# Patient Record
Sex: FEMALE | Race: WHITE | Employment: UNEMPLOYED | URBAN - METROPOLITAN AREA
[De-identification: names, ages, dates, MRNs, and addresses within clinical notes are randomized per-mention and may not be internally consistent; named-entity substitution may affect disease eponyms.]

---

## 2018-01-12 ENCOUNTER — HOSPITAL ENCOUNTER (EMERGENCY)
Facility: CLINIC | Age: 23
Discharge: HOME | End: 2018-01-13
Attending: EMERGENCY MEDICINE
Payer: MEDICARE

## 2018-01-12 VITALS
OXYGEN SATURATION: 98 % | BODY MASS INDEX: 21.79 KG/M2 | RESPIRATION RATE: 18 BRPM | SYSTOLIC BLOOD PRESSURE: 118 MMHG | TEMPERATURE: 98.3 F | DIASTOLIC BLOOD PRESSURE: 76 MMHG | HEIGHT: 63 IN | HEART RATE: 107 BPM | WEIGHT: 123 LBS

## 2018-01-12 DIAGNOSIS — T40.1X1D ACCIDENTAL OVERDOSE OF HEROIN, SUBSEQUENT ENCOUNTER: Primary | ICD-10-CM

## 2018-01-12 PROCEDURE — 99284 EMERGENCY DEPT VISIT MOD MDM: CPT

## 2018-01-12 ASSESSMENT — ENCOUNTER SYMPTOMS
EYE PAIN: 0
VOMITING: 0
STRIDOR: 0
COLOR CHANGE: 0
EYE DISCHARGE: 0
NAUSEA: 0
EYE REDNESS: 0
CONSTIPATION: 0
SHORTNESS OF BREATH: 0
WHEEZING: 0
SORE THROAT: 0
COUGH: 0
DIARRHEA: 0
ABDOMINAL PAIN: 0

## 2018-01-13 PROCEDURE — 6360000002 HC RX W HCPCS: Performed by: EMERGENCY MEDICINE

## 2018-01-13 RX ORDER — IBUPROFEN 600 MG/1
600 TABLET ORAL ONCE
Status: DISCONTINUED | OUTPATIENT
Start: 2018-01-13 | End: 2018-01-13 | Stop reason: HOSPADM

## 2018-01-13 RX ORDER — ONDANSETRON 4 MG/1
4 TABLET, ORALLY DISINTEGRATING ORAL ONCE
Status: COMPLETED | OUTPATIENT
Start: 2018-01-13 | End: 2018-01-13

## 2018-01-13 RX ADMIN — ONDANSETRON 4 MG: 4 TABLET, ORALLY DISINTEGRATING ORAL at 01:32

## 2018-01-13 NOTE — ED PROVIDER NOTES
problems and confusion. PAST MEDICAL HISTORY    has a past medical history of Anxiety; Depression; Endocarditis; Hepatitis C; and Overdose. SURGICAL HISTORY      has a past surgical history that includes Adenoidectomy. CURRENT MEDICATIONS       Previous Medications    ACETAMINOPHEN-CODEINE (TYLENOL/CODEINE #3) 300-30 MG PER TABLET    Take 1 tablet by mouth every 6 hours as needed for Pain    DICYCLOMINE (BENTYL) 10 MG CAPSULE    Take 1 capsule by mouth every 6 hours as needed (cramps)    ETONOGESTREL (IMPLANON SC)    Inject into the skin    IBUPROFEN (ADVIL;MOTRIN) 600 MG TABLET    Take 1 tablet by mouth every 8 hours as needed for Pain    MELATONIN 3 MG TABS TABLET    Take 3 mg by mouth daily    NAPROXEN (NAPROSYN) 500 MG TABLET    Take 500 mg by mouth 2 times daily (with meals)       ALLERGIES     has No Known Allergies. FAMILY HISTORY     has no family status information on file. family history is not on file. SOCIAL HISTORY      reports that she has been smoking. She has been smoking about 0.50 packs per day. She does not have any smokeless tobacco history on file. She reports that she uses drugs. She reports that she does not drink alcohol. PHYSICAL EXAM    (up to 7 for level 4, 8 or more for level 5)   INITIAL VITALS:  height is 5' 3\" (1.6 m) and weight is 55.8 kg (123 lb). Her temperature is 98.3 °F (36.8 °C). Her blood pressure is 118/76 and her pulse is 107. Her respiration is 18 and oxygen saturation is 98%. Physical Exam   Constitutional: She is oriented to person, place, and time. She appears well-developed and well-nourished. No distress. HENT:   Head: Normocephalic and atraumatic. Eyes: Conjunctivae and EOM are normal. Pupils are equal, round, and reactive to light. Right eye exhibits no discharge. Left eye exhibits no discharge. Neck: Normal range of motion. Neck supple. Cardiovascular: Regular rhythm and normal heart sounds. Tachycardia present.     No murmur

## 2019-06-05 ENCOUNTER — HOSPITAL ENCOUNTER (EMERGENCY)
Age: 24
Discharge: HOME | End: 2019-06-05
Attending: EMERGENCY MEDICINE
Payer: MEDICAID

## 2019-06-05 VITALS
WEIGHT: 121 LBS | SYSTOLIC BLOOD PRESSURE: 98 MMHG | BODY MASS INDEX: 21.44 KG/M2 | HEART RATE: 84 BPM | TEMPERATURE: 98.1 F | DIASTOLIC BLOOD PRESSURE: 77 MMHG | HEIGHT: 63 IN | OXYGEN SATURATION: 100 % | RESPIRATION RATE: 18 BRPM

## 2019-06-05 DIAGNOSIS — N73.9 FEMALE PELVIC INFLAMMATORY DISEASE: Primary | ICD-10-CM

## 2019-06-05 LAB
CHP ED QC CHECK: NORMAL
PREGNANCY TEST URINE, POC: NORMAL

## 2019-06-05 PROCEDURE — 81025 URINE PREGNANCY TEST: CPT

## 2019-06-05 PROCEDURE — 87591 N.GONORRHOEAE DNA AMP PROB: CPT

## 2019-06-05 PROCEDURE — 99283 EMERGENCY DEPT VISIT LOW MDM: CPT

## 2019-06-05 PROCEDURE — 87491 CHLMYD TRACH DNA AMP PROBE: CPT

## 2019-06-05 PROCEDURE — 6370000000 HC RX 637 (ALT 250 FOR IP): Performed by: EMERGENCY MEDICINE

## 2019-06-05 RX ORDER — ONDANSETRON 4 MG/1
4 TABLET, ORALLY DISINTEGRATING ORAL ONCE
Status: COMPLETED | OUTPATIENT
Start: 2019-06-05 | End: 2019-06-05

## 2019-06-05 RX ORDER — DOXYCYCLINE HYCLATE 100 MG
100 TABLET ORAL 2 TIMES DAILY
Qty: 20 TABLET | Refills: 0 | Status: SHIPPED | OUTPATIENT
Start: 2019-06-05 | End: 2019-06-15

## 2019-06-05 RX ORDER — AZITHROMYCIN 250 MG/1
2000 TABLET, FILM COATED ORAL ONCE
Status: COMPLETED | OUTPATIENT
Start: 2019-06-05 | End: 2019-06-05

## 2019-06-05 RX ADMIN — AZITHROMYCIN MONOHYDRATE 2000 MG: 250 TABLET ORAL at 09:24

## 2019-06-05 RX ADMIN — ONDANSETRON 4 MG: 4 TABLET, ORALLY DISINTEGRATING ORAL at 10:04

## 2019-06-05 ASSESSMENT — ENCOUNTER SYMPTOMS: ABDOMINAL PAIN: 1

## 2019-06-05 NOTE — ED PROVIDER NOTES
EMERGENCY DEPARTMENT ENCOUNTER    Pt Name: Taiwo Rizvi  MRN: [id number]  [place] 1995  Date of evaluation: 6/5/19  CHIEF COMPLAINT       Chief Complaint   Patient presents with    Exposure to STD    Vaginal Discharge     HISTORY OF PRESENT ILLNESS   Presents with vaginal discharge for 1.5 weeks. Partner was diagnosed and treated for urethritis. Patient states she gave it to him. Declines Rocephin. Had LLQ/pelvic pain for 3 days, but it resolved. The history is provided by the patient. Vaginal Discharge   Severity:  Moderate  Onset quality:  Unable to specify  Duration:  9 days  Timing:  Constant  Progression:  Worsening  Chronicity:  New  Relieved by:  Nothing  Worsened by:  Nothing  Ineffective treatments:  None tried  Associated symptoms: abdominal pain    Associated symptoms: no dysuria, no fever and no urinary frequency        REVIEW OF SYSTEMS     Review of Systems   Constitutional: Negative for fever. Gastrointestinal: Positive for abdominal pain. Genitourinary: Positive for vaginal discharge. Negative for dysuria. PASTMEDICAL HISTORY     Past Medical History:   Diagnosis Date    Anxiety     Depression     Endocarditis     Hepatitis C     Overdose      SURGICAL HISTORY       Past Surgical History:   Procedure Laterality Date    ADENOIDECTOMY       CURRENT MEDICATIONS       Previous Medications    ACETAMINOPHEN-CODEINE (TYLENOL/CODEINE #3) 300-30 MG PER TABLET    Take 1 tablet by mouth every 6 hours as needed for Pain    DICYCLOMINE (BENTYL) 10 MG CAPSULE    Take 1 capsule by mouth every 6 hours as needed (cramps)    ETONOGESTREL (IMPLANON SC)    Inject into the skin    IBUPROFEN (ADVIL;MOTRIN) 600 MG TABLET    Take 1 tablet by mouth every 8 hours as needed for Pain    MELATONIN 3 MG TABS TABLET    Take 3 mg by mouth daily    NAPROXEN (NAPROSYN) 500 MG TABLET    Take 500 mg by mouth 2 times daily (with meals)     ALLERGIES     has No Known Allergies.   FAMILY HISTORY     has no family status information on file. SOCIAL HISTORY       Social History     Tobacco Use    Smoking status: Current Every Day Smoker     Packs/day: 0.50   Substance Use Topics    Alcohol use: No    Drug use: Yes     Comment: hx heroin, now [place] white     PHYSICAL EXAM     INITIAL VITALS: BP 98/77   Pulse 84   Temp 98.1 °F (36.7 °C) (Oral)   Resp 18   Ht 5' 3\" (1.6 m)   Wt 121 lb (54.9 kg)   LMP 05/15/2019   SpO2 100%   BMI 21.43 kg/m²    Physical Exam   Constitutional: She appears well-developed and well-nourished. No distress. HENT:   Mouth/Throat: Oropharynx is clear and moist. No oropharyngeal exudate. Eyes: Conjunctivae are normal. Right eye exhibits no discharge. Left eye exhibits no discharge. Cardiovascular: Normal rate, regular rhythm and intact distal pulses. Abdominal: Soft. There is no tenderness. There is no guarding. Neurological: She is alert. No sensory deficit. She exhibits normal muscle tone. Coordination normal.   Skin: Capillary refill takes less than 2 seconds. No rash noted. Psychiatric: She has a normal mood and affect. Her behavior is normal.       MEDICAL DECISION MAKING:   Presents with symptoms of STI. Refused Rocephin, despite discussion regarding resistance to oral medications. Especially concerning as plan to treat for PID. Still declines. Suprax is unavailable. Treated with Azithromycin 2 grams PO. Urine pregnancy test is negative. Rx for Doxycycline for 10 days provided. Directed for f/u. Refrain from unprotected intercourse until completion of treatment and asymptomatic. Educated on s/s TOA. Return immediately for any concerns.  Currently afebrile and non-toxic             CRITICAL CARE:       PROCEDURES:    Procedures    DIAGNOSTIC RESULTS   EKG:All EKG's are interpreted by the Emergency Department Physician who either signs or Co-signs this chart in the absence of a cardiologist.        RADIOLOGY:All plain film, CT, MRI, and formal ultrasound images (except ED bedside ultrasound) are read by the radiologist, see reports below, unless otherwisenoted in MDM or here. No orders to display     LABS: All lab results were reviewed by myself, and all abnormals are listed below. Labs Reviewed   C.TRACHOMATIS N.GONORRHOEAE DNA, URINE   POCT URINE PREGNANCY       EMERGENCY DEPARTMENTCOURSE:         Vitals:    Vitals:    06/05/19 0858   BP: 98/77   Pulse: 84   Resp: 18   Temp: 98.1 °F (36.7 °C)   TempSrc: Oral   SpO2: 100%   Weight: 121 lb (54.9 kg)   Height: 5' 3\" (1.6 m)       The patient was given the following medications while in the emergency department:  Orders Placed This Encounter   Medications    azithromycin (ZITHROMAX) tablet 2,000 mg    doxycycline hyclate (VIBRA-TABS) 100 MG tablet     Sig: Take 1 tablet by mouth 2 times daily for 10 days     Dispense:  20 tablet     Refill:  0     CONSULTS:  None    FINAL IMPRESSION      1.  Female pelvic inflammatory disease          DISPOSITION/PLAN   DISPOSITION Decision To Discharge 06/05/2019 09:22:09 AM      PATIENT REFERRED TO:  Call SAME-DAY PCP  [phone]  In 1 week      DISCHARGE MEDICATIONS:  New Prescriptions    DOXYCYCLINE HYCLATE (VIBRA-TABS) 100 MG TABLET    Take 1 tablet by mouth 2 times daily for 10 days     Shahida Sheikh MD  Attending Emergency Physician                    Davide Arceo MD  06/05/19 2831
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
## 2019-06-06 LAB
C. TRACHOMATIS DNA ,URINE: ABNORMAL
HCG, PREGNANCY URINE (POC): NEGATIVE
N. GONORRHOEAE DNA, URINE: ABNORMAL
SPECIMEN DESCRIPTION: ABNORMAL

## 2020-02-07 ENCOUNTER — HOSPITAL ENCOUNTER (EMERGENCY)
Age: 25
Discharge: HOME | End: 2020-02-07
Attending: EMERGENCY MEDICINE
Payer: MEDICAID

## 2020-02-07 ENCOUNTER — APPOINTMENT (OUTPATIENT)
Dept: CT IMAGING | Age: 25
End: 2020-02-07
Payer: MEDICAID

## 2020-02-07 VITALS
BODY MASS INDEX: 24.8 KG/M2 | HEIGHT: 63 IN | HEART RATE: 83 BPM | RESPIRATION RATE: 19 BRPM | DIASTOLIC BLOOD PRESSURE: 58 MMHG | SYSTOLIC BLOOD PRESSURE: 107 MMHG | WEIGHT: 140 LBS | OXYGEN SATURATION: 97 % | TEMPERATURE: 98.7 F

## 2020-02-07 LAB
ABSOLUTE EOS #: <0.03 K/UL (ref 0–0.44)
ABSOLUTE IMMATURE GRANULOCYTE: 0.06 K/UL (ref 0–0.3)
ABSOLUTE LYMPH #: 0.89 K/UL (ref 1.1–3.7)
ABSOLUTE MONO #: 1.14 K/UL (ref 0.1–1.2)
ACETAMINOPHEN LEVEL: <5 UG/ML (ref 10–30)
AMPHETAMINE SCREEN URINE: NEGATIVE
ANION GAP SERPL CALCULATED.3IONS-SCNC: 14 MMOL/L (ref 9–17)
BARBITURATE SCREEN URINE: NEGATIVE
BASOPHILS # BLD: 0 % (ref 0–2)
BASOPHILS ABSOLUTE: 0.04 K/UL (ref 0–0.2)
BENZODIAZEPINE SCREEN, URINE: NEGATIVE
BUN BLDV-MCNC: 16 MG/DL (ref 6–20)
BUN/CREAT BLD: ABNORMAL (ref 9–20)
BUPRENORPHINE URINE: ABNORMAL
CALCIUM SERPL-MCNC: 9.5 MG/DL (ref 8.6–10.4)
CANNABINOID SCREEN URINE: POSITIVE
CHLORIDE BLD-SCNC: 100 MMOL/L (ref 98–107)
CO2: 20 MMOL/L (ref 20–31)
COCAINE METABOLITE, URINE: POSITIVE
CREAT SERPL-MCNC: 0.75 MG/DL (ref 0.5–0.9)
DIFFERENTIAL TYPE: ABNORMAL
EOSINOPHILS RELATIVE PERCENT: 0 % (ref 1–4)
ETHANOL PERCENT: <0.01 %
ETHANOL: <10 MG/DL
GFR AFRICAN AMERICAN: >60 ML/MIN
GFR NON-AFRICAN AMERICAN: >60 ML/MIN
GFR SERPL CREATININE-BSD FRML MDRD: ABNORMAL ML/MIN/{1.73_M2}
GFR SERPL CREATININE-BSD FRML MDRD: ABNORMAL ML/MIN/{1.73_M2}
GLUCOSE BLD-MCNC: 173 MG/DL (ref 70–99)
HCG QUALITATIVE: NEGATIVE
HCG(URINE) PREGNANCY TEST: NEGATIVE
HCT VFR BLD CALC: 41.6 % (ref 36.3–47.1)
HEMOGLOBIN: 13.7 G/DL (ref 11.9–15.1)
IMMATURE GRANULOCYTES: 0 %
LYMPHOCYTES # BLD: 5 % (ref 24–43)
MCH RBC QN AUTO: 30 PG (ref 25.2–33.5)
MCHC RBC AUTO-ENTMCNC: 32.9 G/DL (ref 28.4–34.8)
MCV RBC AUTO: 91 FL (ref 82.6–102.9)
MDMA URINE: ABNORMAL
METHADONE SCREEN, URINE: NEGATIVE
METHAMPHETAMINE, URINE: ABNORMAL
MONOCYTES # BLD: 7 % (ref 3–12)
NRBC AUTOMATED: 0 PER 100 WBC
OPIATES, URINE: POSITIVE
OXYCODONE SCREEN URINE: NEGATIVE
PDW BLD-RTO: 15.9 % (ref 11.8–14.4)
PHENCYCLIDINE, URINE: NEGATIVE
PLATELET # BLD: 319 K/UL (ref 138–453)
PLATELET ESTIMATE: ABNORMAL
PMV BLD AUTO: 10.6 FL (ref 8.1–13.5)
POTASSIUM SERPL-SCNC: 4.6 MMOL/L (ref 3.7–5.3)
PROPOXYPHENE, URINE: ABNORMAL
RBC # BLD: 4.57 M/UL (ref 3.95–5.11)
RBC # BLD: ABNORMAL 10*6/UL
SALICYLATE LEVEL: <1 MG/DL (ref 3–10)
SEG NEUTROPHILS: 88 % (ref 36–65)
SEGMENTED NEUTROPHILS ABSOLUTE COUNT: 14.77 K/UL (ref 1.5–8.1)
SODIUM BLD-SCNC: 134 MMOL/L (ref 135–144)
TEST INFORMATION: ABNORMAL
TOXIC TRICYCLIC SC,BLOOD: NEGATIVE
TRICYCLIC ANTIDEPRESSANTS, UR: ABNORMAL
WBC # BLD: 16.9 K/UL (ref 3.5–11.3)
WBC # BLD: ABNORMAL 10*3/UL

## 2020-02-07 PROCEDURE — 99285 EMERGENCY DEPT VISIT HI MDM: CPT

## 2020-02-07 PROCEDURE — 72125 CT NECK SPINE W/O DYE: CPT

## 2020-02-07 PROCEDURE — G0480 DRUG TEST DEF 1-7 CLASSES: HCPCS

## 2020-02-07 PROCEDURE — 85025 COMPLETE CBC W/AUTO DIFF WBC: CPT

## 2020-02-07 PROCEDURE — 80307 DRUG TEST PRSMV CHEM ANLYZR: CPT

## 2020-02-07 PROCEDURE — 81025 URINE PREGNANCY TEST: CPT

## 2020-02-07 PROCEDURE — 6360000002 HC RX W HCPCS: Performed by: STUDENT IN AN ORGANIZED HEALTH CARE EDUCATION/TRAINING PROGRAM

## 2020-02-07 PROCEDURE — 80048 BASIC METABOLIC PNL TOTAL CA: CPT

## 2020-02-07 PROCEDURE — 84703 CHORIONIC GONADOTROPIN ASSAY: CPT

## 2020-02-07 PROCEDURE — 70450 CT HEAD/BRAIN W/O DYE: CPT

## 2020-02-07 PROCEDURE — 6370000000 HC RX 637 (ALT 250 FOR IP): Performed by: STUDENT IN AN ORGANIZED HEALTH CARE EDUCATION/TRAINING PROGRAM

## 2020-02-07 PROCEDURE — 2580000003 HC RX 258: Performed by: STUDENT IN AN ORGANIZED HEALTH CARE EDUCATION/TRAINING PROGRAM

## 2020-02-07 PROCEDURE — 93005 ELECTROCARDIOGRAM TRACING: CPT | Performed by: STUDENT IN AN ORGANIZED HEALTH CARE EDUCATION/TRAINING PROGRAM

## 2020-02-07 PROCEDURE — 96374 THER/PROPH/DIAG INJ IV PUSH: CPT

## 2020-02-07 RX ORDER — ACETAMINOPHEN 500 MG
1000 TABLET ORAL ONCE
Status: COMPLETED | OUTPATIENT
Start: 2020-02-07 | End: 2020-02-07

## 2020-02-07 RX ORDER — ONDANSETRON 2 MG/ML
4 INJECTION INTRAMUSCULAR; INTRAVENOUS ONCE
Status: COMPLETED | OUTPATIENT
Start: 2020-02-07 | End: 2020-02-07

## 2020-02-07 RX ORDER — 0.9 % SODIUM CHLORIDE 0.9 %
1000 INTRAVENOUS SOLUTION INTRAVENOUS ONCE
Status: COMPLETED | OUTPATIENT
Start: 2020-02-07 | End: 2020-02-07

## 2020-02-07 RX ADMIN — ONDANSETRON 4 MG: 2 INJECTION INTRAMUSCULAR; INTRAVENOUS at 08:41

## 2020-02-07 RX ADMIN — SODIUM CHLORIDE 1000 ML: 9 INJECTION, SOLUTION INTRAVENOUS at 07:06

## 2020-02-07 RX ADMIN — ACETAMINOPHEN 1000 MG: 500 TABLET ORAL at 10:04

## 2020-02-07 ASSESSMENT — PAIN SCALES - GENERAL
PAINLEVEL_OUTOF10: 5
PAINLEVEL_OUTOF10: 6

## 2020-02-07 ASSESSMENT — PAIN DESCRIPTION - LOCATION: LOCATION: NECK

## 2020-02-07 ASSESSMENT — PAIN DESCRIPTION - PAIN TYPE: TYPE: CHRONIC PAIN

## 2020-02-07 ASSESSMENT — ENCOUNTER SYMPTOMS
ABDOMINAL PAIN: 0
CHEST TIGHTNESS: 0
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
## 2020-02-07 NOTE — ED NOTES
Report recd from [facility], pt resting on cot waiting completion of testing. Pt is awake and alert and Ox3 and cooperative.       Gustavo Kent RN  02/07/20 [address] Lebec Trae Alexander RN  02/07/20 8580
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
## 2020-02-08 LAB
EKG ATRIAL RATE: 113 BPM
EKG P AXIS: 32 DEGREES
EKG P-R INTERVAL: 122 MS
EKG Q-T INTERVAL: 334 MS
EKG QRS DURATION: 76 MS
EKG QTC CALCULATION (BAZETT): 458 MS
EKG R AXIS: 80 DEGREES
EKG T AXIS: 57 DEGREES
EKG VENTRICULAR RATE: 113 BPM

## 2020-09-15 ENCOUNTER — HOSPITAL ENCOUNTER (EMERGENCY)
Age: 25
Discharge: HOME | End: 2020-09-15
Attending: EMERGENCY MEDICINE
Payer: MEDICAID

## 2020-09-15 VITALS
SYSTOLIC BLOOD PRESSURE: 118 MMHG | TEMPERATURE: 98.1 F | HEART RATE: 94 BPM | DIASTOLIC BLOOD PRESSURE: 80 MMHG | OXYGEN SATURATION: 100 % | RESPIRATION RATE: 16 BRPM

## 2020-09-15 PROCEDURE — 6370000000 HC RX 637 (ALT 250 FOR IP): Performed by: STUDENT IN AN ORGANIZED HEALTH CARE EDUCATION/TRAINING PROGRAM

## 2020-09-15 PROCEDURE — 99282 EMERGENCY DEPT VISIT SF MDM: CPT

## 2020-09-15 RX ORDER — IBUPROFEN 800 MG/1
800 TABLET ORAL ONCE
Status: COMPLETED | OUTPATIENT
Start: 2020-09-15 | End: 2020-09-15

## 2020-09-15 RX ORDER — AMOXICILLIN AND CLAVULANATE POTASSIUM 875; 125 MG/1; MG/1
1 TABLET, FILM COATED ORAL ONCE
Status: COMPLETED | OUTPATIENT
Start: 2020-09-15 | End: 2020-09-15

## 2020-09-15 RX ORDER — IBUPROFEN 800 MG/1
800 TABLET ORAL EVERY 6 HOURS PRN
Qty: 20 TABLET | Refills: 0 | Status: SHIPPED | OUTPATIENT
Start: 2020-09-15 | End: 2020-09-18 | Stop reason: SDUPTHER

## 2020-09-15 RX ORDER — AMOXICILLIN AND CLAVULANATE POTASSIUM 875; 125 MG/1; MG/1
1 TABLET, FILM COATED ORAL 2 TIMES DAILY
Qty: 14 TABLET | Refills: 0 | Status: SHIPPED | OUTPATIENT
Start: 2020-09-15 | End: 2020-09-22

## 2020-09-15 RX ADMIN — IBUPROFEN 800 MG: 800 TABLET, FILM COATED ORAL at 16:31

## 2020-09-15 RX ADMIN — AMOXICILLIN AND CLAVULANATE POTASSIUM 1 TABLET: 875; 125 TABLET, FILM COATED ORAL at 16:31

## 2020-09-15 ASSESSMENT — PAIN DESCRIPTION - ORIENTATION: ORIENTATION: LEFT;UPPER

## 2020-09-15 ASSESSMENT — PAIN DESCRIPTION - LOCATION: LOCATION: TEETH

## 2020-09-15 ASSESSMENT — ENCOUNTER SYMPTOMS
SORE THROAT: 0
FACIAL SWELLING: 1
SINUS PRESSURE: 0
BACK PAIN: 0
SHORTNESS OF BREATH: 0
NAUSEA: 0
ABDOMINAL PAIN: 0
SINUS PAIN: 0
VOICE CHANGE: 0
COUGH: 0
VOMITING: 0
CONSTIPATION: 0
TROUBLE SWALLOWING: 0
DIARRHEA: 0
RHINORRHEA: 0

## 2020-09-15 ASSESSMENT — PAIN SCALES - GENERAL
PAINLEVEL_OUTOF10: 8
PAINLEVEL_OUTOF10: 8

## 2020-09-15 ASSESSMENT — PAIN DESCRIPTION - PROGRESSION: CLINICAL_PROGRESSION: GRADUALLY WORSENING

## 2020-09-15 ASSESSMENT — PAIN DESCRIPTION - DESCRIPTORS: DESCRIPTORS: ACHING;THROBBING

## 2020-09-15 ASSESSMENT — PAIN DESCRIPTION - ONSET: ONSET: PROGRESSIVE

## 2020-09-15 ASSESSMENT — PAIN DESCRIPTION - PAIN TYPE: TYPE: ACUTE PAIN

## 2020-09-15 ASSESSMENT — PAIN DESCRIPTION - FREQUENCY: FREQUENCY: INTERMITTENT

## 2020-09-15 NOTE — ED NOTES
Pt. Ambulatory with steady gait to ER room 5 from triage  Pt. Presents with (L) upper jaw, tooth and soft tissue pain. Pt. States she has a dental appointment tomorrow but the pain is unbearable at this time. Pt. Denies all other medical concerns. Vitals stable. Call light given. Awaiting further orders.  Will continue to monitor and reassess     Lorena Navarrete, DANE  09/15/20 1142

## 2020-09-15 NOTE — ED PROVIDER NOTES
[address] [facility]     Emergency Department     Faculty Attestation    I performed a history and physical examination of the patient and discussed management with the resident. I have reviewed and agree with the residents findings including all diagnostic interpretations, and treatment plans as written at the time of my review. Any areas of disagreement are noted on the chart. I was personally present for the key portions of any procedures. I have documented in the chart those procedures where I was not present during the key portions. For Physician Assistant/ Nurse Practitioner cases/documentation I have personally evaluated this patient and have completed at least one if not all key elements of the E/M (history, physical exam, and MDM). Additional findings are as noted. This patient was evaluated in the Emergency Department for symptoms described in the history of present illness. The patient was evaluated in the context of the global COVID-19 pandemic, which necessitated consideration that the patient might be at risk for infection with the SARS-CoV-2 virus that causes COVID-19. Institutional protocols and algorithms that pertain to the evaluation of patients at risk for COVID-19 are in a state of rapid change based on information released by regulatory bodies including the CDC and federal and state organizations. These policies and algorithms were followed during the patient's care in the ED. Primary Care Physician: No primary care provider on file. History: This is a 22 y.o. female who presents to the Emergency Department with complaint of dental pain. The patient complains of dental pain in the left upper teeth the. She has an appointment tomorrow to see a dentist.    Physical:   oral temperature is 98.1 °F (36.7 °C). Her blood pressure is 118/80 and her pulse is 94. Her respiration is 16 and oxygen saturation is 100%.   Multiple dental caries and poor dentition noted on the left upper teeth. There is no abscess noted. There is no tongue elevation. There is no pooling of oral secretions    Impression: Dental pain, caries    Plan: Antibiotics, analgesia. The patient instructed to keep her appointment tomorrow for dentist.      (Please note that portions of this note were completed with a voice recognition program.  Efforts were made to edit the dictations but occasionally words are mis-transcribed.)    Vasu Billy.  Paulla Bence, MD, [facility]  Attending Emergency Medicine Physician        Tita Henderson MD  09/15/20 [id number]

## 2020-09-15 NOTE — ED PROVIDER NOTES
[address] [facility]  ED  Emergency Department Encounter  Emergency Medicine Resident     Pt Name: Sammie Bejarano  MRN: [id number]  [place] 1995  Date of evaluation: 9/15/20  PCP:  No primary care provider on file. CHIEF COMPLAINT       Chief Complaint   Patient presents with    Dental Pain     (L) upper tooth pain, dentist appointment tomorrow, pain unbearable       HISTORY OFPRESENT ILLNESS  (Location/Symptom, Timing/Onset, Context/Setting, Quality, Duration, Modifying Factors,Severity.)      Sammie Bejarano is a 22 y. o.yo female with a history of endocarditis who presents with 3 days of worsening left upper teeth pain, now with swelling. Patient reports difficulty eating and inability to tolerate cold or hot liquids. She also reports some swelling to her left cheek. She denies fever, chills, congestion, rhinorrhea, sore throat, difficulty swallowing, swelling of her tongue. She has not taken any medications for symptoms. She has an appointment with a dentist tomorrow states she is unable to manage the pain at home. She denies any chronic medical problems and takes no medications. She denies any allergies. PAST MEDICAL / SURGICAL / SOCIAL / FAMILY HISTORY      has a past medical history of Anxiety, Depression, Endocarditis, Hepatitis C, and Overdose.     has a past surgical history that includes Adenoidectomy. Social:  reports that she has been smoking. She has been smoking about 0.50 packs per day. She does not have any smokeless tobacco history on file. She reports current drug use. She reports that she does not drink alcohol. Family Hx: History reviewed. No pertinent family history. Allergies:  Patient has no known allergies. Home Medications:  Prior to Admission medications    Medication Sig Start Date End Date Taking?  Authorizing Provider   amoxicillin-clavulanate (AUGMENTIN) 875-125 MG per tablet Take 1 tablet by mouth 2 times daily for 7 days 9/15/20 9/22/20 Yes Affect: Mood normal.         Behavior: Behavior normal.         DIFFERENTIAL  DIAGNOSIS     DDX: Dental caries, dental infection, dental abscess    Initial MDM/Plan: 22 y.o. female with a history of endocarditis who presents with 3 days of worsening left upper teeth pain, now with swelling. On physical exam, patient is nontoxic-appearing. She speaks in full sentences with no change in voice. She does have some mild swelling in left maxillary region. She has very poor dentition with decay of teeth 10-14 tenderness to percussion. No obvious abscess or drainage. No posterior oropharyngeal edema, submental edema or anterior neck swelling. No evidence for April's angina. Will treat symptomatically for pain and start patient on antibiotics. We will give her first dose in the department. Discussed with patient importance of following up with her dentist in the morning. Strict return precautions also given including to return for fever, vomiting, worsening swelling or other concerning symptoms. DIAGNOSTIC RESULTS / EMERGENCYDEPARTMENT COURSE / MDM     LABS:  No results found for this visit on 09/15/20. RADIOLOGY:  None      EKG  None      PROCEDURES:  None      CONSULTS:  None      FINAL IMPRESSION      1. Pain due to dental caries    2.  Dental infection          DISPOSITION / PLAN     DISPOSITION Decision To Discharge 09/15/2020 04:19:59 PM      PATIENT REFERRED TO:  Please keep appointment with your dentist tomorrow          [facility]  [address] [address]  [phone]    As needed      DISCHARGE MEDICATIONS:  Discharge Medication List as of 9/15/2020  4:24 PM      START taking these medications    Details   amoxicillin-clavulanate (AUGMENTIN) 875-125 MG per tablet Take 1 tablet by mouth 2 times daily for 7 days, Disp-14 tablet,Tri Khanna, DO  Emergency Medicine Resident    (Please note that portions of this note were completed with a voice recognition program.Efforts were made to edit the dictations but occasionally words are mis-transcribed.)        Malu Oliveira DO  Resident  09/15/20 [phone]

## 2020-09-18 ENCOUNTER — APPOINTMENT (OUTPATIENT)
Dept: GENERAL RADIOLOGY | Age: 25
End: 2020-09-18
Payer: MEDICAID

## 2020-09-18 ENCOUNTER — APPOINTMENT (OUTPATIENT)
Dept: CT IMAGING | Age: 25
End: 2020-09-18
Payer: MEDICAID

## 2020-09-18 ENCOUNTER — HOSPITAL ENCOUNTER (EMERGENCY)
Age: 25
Discharge: HOME | End: 2020-09-18
Attending: EMERGENCY MEDICINE
Payer: MEDICAID

## 2020-09-18 VITALS
SYSTOLIC BLOOD PRESSURE: 103 MMHG | TEMPERATURE: 98.2 F | OXYGEN SATURATION: 100 % | WEIGHT: 130 LBS | HEART RATE: 76 BPM | DIASTOLIC BLOOD PRESSURE: 63 MMHG | RESPIRATION RATE: 16 BRPM | BODY MASS INDEX: 22.2 KG/M2 | HEIGHT: 64 IN

## 2020-09-18 LAB
ABSOLUTE EOS #: 0.07 K/UL (ref 0–0.44)
ABSOLUTE IMMATURE GRANULOCYTE: <0.03 K/UL (ref 0–0.3)
ABSOLUTE LYMPH #: 2.43 K/UL (ref 1.1–3.7)
ABSOLUTE MONO #: 0.57 K/UL (ref 0.1–1.2)
ALBUMIN SERPL-MCNC: 4.5 G/DL (ref 3.5–5.2)
ALBUMIN/GLOBULIN RATIO: 1 (ref 1–2.5)
ALP BLD-CCNC: 73 U/L (ref 35–104)
ALT SERPL-CCNC: 77 U/L (ref 5–33)
ANION GAP SERPL CALCULATED.3IONS-SCNC: 11 MMOL/L (ref 9–17)
AST SERPL-CCNC: 58 U/L
BASOPHILS # BLD: 1 % (ref 0–2)
BASOPHILS ABSOLUTE: 0.05 K/UL (ref 0–0.2)
BILIRUB SERPL-MCNC: 0.34 MG/DL (ref 0.3–1.2)
BUN BLDV-MCNC: 13 MG/DL (ref 6–20)
BUN/CREAT BLD: ABNORMAL (ref 9–20)
CALCIUM SERPL-MCNC: 9.9 MG/DL (ref 8.6–10.4)
CHLORIDE BLD-SCNC: 102 MMOL/L (ref 98–107)
CO2: 23 MMOL/L (ref 20–31)
CREAT SERPL-MCNC: 0.54 MG/DL (ref 0.5–0.9)
DIFFERENTIAL TYPE: ABNORMAL
EOSINOPHILS RELATIVE PERCENT: 1 % (ref 1–4)
GFR AFRICAN AMERICAN: >60 ML/MIN
GFR NON-AFRICAN AMERICAN: >60 ML/MIN
GFR SERPL CREATININE-BSD FRML MDRD: ABNORMAL ML/MIN/{1.73_M2}
GFR SERPL CREATININE-BSD FRML MDRD: ABNORMAL ML/MIN/{1.73_M2}
GLUCOSE BLD-MCNC: 81 MG/DL (ref 70–99)
HCT VFR BLD CALC: 43.6 % (ref 36.3–47.1)
HEMOGLOBIN: 13.8 G/DL (ref 11.9–15.1)
IMMATURE GRANULOCYTES: 0 %
LYMPHOCYTES # BLD: 35 % (ref 24–43)
MCH RBC QN AUTO: 28.5 PG (ref 25.2–33.5)
MCHC RBC AUTO-ENTMCNC: 31.7 G/DL (ref 28.4–34.8)
MCV RBC AUTO: 89.9 FL (ref 82.6–102.9)
MONOCYTES # BLD: 8 % (ref 3–12)
NRBC AUTOMATED: 0 PER 100 WBC
PDW BLD-RTO: 17 % (ref 11.8–14.4)
PLATELET # BLD: 356 K/UL (ref 138–453)
PLATELET ESTIMATE: ABNORMAL
PMV BLD AUTO: 11.4 FL (ref 8.1–13.5)
POTASSIUM SERPL-SCNC: 4.5 MMOL/L (ref 3.7–5.3)
RBC # BLD: 4.85 M/UL (ref 3.95–5.11)
RBC # BLD: ABNORMAL 10*6/UL
SEG NEUTROPHILS: 55 % (ref 36–65)
SEGMENTED NEUTROPHILS ABSOLUTE COUNT: 3.9 K/UL (ref 1.5–8.1)
SODIUM BLD-SCNC: 136 MMOL/L (ref 135–144)
TOTAL PROTEIN: 9.2 G/DL (ref 6.4–8.3)
WBC # BLD: 7 K/UL (ref 3.5–11.3)
WBC # BLD: ABNORMAL 10*3/UL

## 2020-09-18 PROCEDURE — 71046 X-RAY EXAM CHEST 2 VIEWS: CPT

## 2020-09-18 PROCEDURE — 6360000004 HC RX CONTRAST MEDICATION: Performed by: EMERGENCY MEDICINE

## 2020-09-18 PROCEDURE — 2580000003 HC RX 258: Performed by: EMERGENCY MEDICINE

## 2020-09-18 PROCEDURE — 6360000002 HC RX W HCPCS: Performed by: EMERGENCY MEDICINE

## 2020-09-18 PROCEDURE — 80053 COMPREHEN METABOLIC PANEL: CPT

## 2020-09-18 PROCEDURE — 96374 THER/PROPH/DIAG INJ IV PUSH: CPT

## 2020-09-18 PROCEDURE — 85025 COMPLETE CBC W/AUTO DIFF WBC: CPT

## 2020-09-18 PROCEDURE — 70487 CT MAXILLOFACIAL W/DYE: CPT

## 2020-09-18 PROCEDURE — 87040 BLOOD CULTURE FOR BACTERIA: CPT

## 2020-09-18 PROCEDURE — 6370000000 HC RX 637 (ALT 250 FOR IP): Performed by: EMERGENCY MEDICINE

## 2020-09-18 PROCEDURE — 99284 EMERGENCY DEPT VISIT MOD MDM: CPT

## 2020-09-18 RX ORDER — ACETAMINOPHEN 500 MG
500 TABLET ORAL 4 TIMES DAILY PRN
Qty: 40 TABLET | Refills: 0 | Status: SHIPPED | OUTPATIENT
Start: 2020-09-18 | End: 2020-12-03

## 2020-09-18 RX ORDER — KETOROLAC TROMETHAMINE 15 MG/ML
15 INJECTION, SOLUTION INTRAMUSCULAR; INTRAVENOUS ONCE
Status: DISCONTINUED | OUTPATIENT
Start: 2020-09-18 | End: 2020-09-18

## 2020-09-18 RX ORDER — FLUCONAZOLE 50 MG/1
150 TABLET ORAL ONCE
Status: DISCONTINUED | OUTPATIENT
Start: 2020-09-18 | End: 2020-09-18

## 2020-09-18 RX ORDER — IBUPROFEN 800 MG/1
800 TABLET ORAL EVERY 6 HOURS PRN
Qty: 20 TABLET | Refills: 0 | Status: SHIPPED | OUTPATIENT
Start: 2020-09-18 | End: 2020-12-03

## 2020-09-18 RX ORDER — IBUPROFEN 800 MG/1
800 TABLET ORAL ONCE
Status: COMPLETED | OUTPATIENT
Start: 2020-09-18 | End: 2020-09-18

## 2020-09-18 RX ORDER — HYDROXYZINE HYDROCHLORIDE 10 MG/1
10 TABLET, FILM COATED ORAL ONCE
Status: COMPLETED | OUTPATIENT
Start: 2020-09-18 | End: 2020-09-18

## 2020-09-18 RX ADMIN — IOHEXOL 75 ML: 350 INJECTION, SOLUTION INTRAVENOUS at 15:11

## 2020-09-18 RX ADMIN — HYDROXYZINE HYDROCHLORIDE 10 MG: 10 TABLET ORAL at 14:11

## 2020-09-18 RX ADMIN — IBUPROFEN 800 MG: 800 TABLET, FILM COATED ORAL at 12:41

## 2020-09-18 RX ADMIN — CEFTRIAXONE SODIUM 1 G: 1 INJECTION, POWDER, FOR SOLUTION INTRAMUSCULAR; INTRAVENOUS at 15:59

## 2020-09-18 ASSESSMENT — ENCOUNTER SYMPTOMS
VOMITING: 0
RHINORRHEA: 1
ABDOMINAL PAIN: 0
SORE THROAT: 0
SHORTNESS OF BREATH: 1
DIARRHEA: 0
CONSTIPATION: 0
NAUSEA: 0
TROUBLE SWALLOWING: 0

## 2020-09-18 ASSESSMENT — PAIN DESCRIPTION - ORIENTATION: ORIENTATION: LEFT

## 2020-09-18 ASSESSMENT — PAIN DESCRIPTION - DESCRIPTORS: DESCRIPTORS: ACHING

## 2020-09-18 ASSESSMENT — PAIN SCALES - GENERAL
PAINLEVEL_OUTOF10: 8
PAINLEVEL_OUTOF10: 7

## 2020-09-18 ASSESSMENT — PAIN DESCRIPTION - FREQUENCY: FREQUENCY: INTERMITTENT

## 2020-09-18 NOTE — ED PROVIDER NOTES
[facility]     Emergency Department     Faculty Attestation    I performed a history and physical examination of the patient and discussed management with the resident. I reviewed the resident´s note and agree with the documented findings and plan of care. Any areas of disagreement are noted on the chart. I was personally present for the key portions of any procedures. I have documented in the chart those procedures where I was not present during the key portions. I have reviewed the emergency nurses triage note. I agree with the chief complaint, past medical history, past surgical history, allergies, medications, social and family history as documented unless otherwise noted below. For Physician Assistant/ Nurse Practitioner cases/documentation I have personally evaluated this patient and have completed at least one if not all key elements of the E/M (history, physical exam, and MDM). Additional findings are as noted. Good airway, no drooling, no sublingual swelling, pain around multiple teeth left upper jaw with overlying facial swelling failed outpatient Augmentin treatment,, no swelling over the neck, no cervical lymphadenopathy. Heart exam normal. No meningeal signs. Neuro intact.     Tay Dillon MD [facility]  Attending Physician         Jessica Mckeon MD  09/18/20 [phone]

## 2020-09-18 NOTE — ED PROVIDER NOTES
[address] [facility]  ED  Emergency Department Encounter  EmergencyMedicine Resident     Pt Sp Reyes  MRN: [id number]  [place] 1995  Date of evaluation: 9/18/20  PCP:  No primary care provider on file. CHIEF COMPLAINT       Chief Complaint   Patient presents with    Facial Swelling     Was seen a couple of days ago for teeth abcess taking antibiotics but face swelling now. HISTORY OF PRESENT ILLNESS  (Location/Symptom, Timing/Onset, Context/Setting, Quality, Duration, Modifying Factors, Severity.)      Jacki Murguia is a 22 y.o. female who presents to the emergency department with worsening left-sided facial swelling. Patient was evaluated in the emergency department 2 days ago and discharged on Augmentin and ibuprofen which she has been taking as instructed. She states that the swelling has worsened in her left side of her face and she feels as though it is going under her eye although she denies difficulty with eye movement or trouble seeing or blurry vision. 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 She denies fever, chills, nausea, vomiting, chest pain, or numbness or tingling anywhere. She does endorse some chronic shortness of breath. PAST MEDICAL / SURGICAL / SOCIAL / FAMILY HISTORY      has a past medical history of Anxiety, Depression, Endocarditis, Hepatitis C, and Overdose.     has a past surgical history that includes Adenoidectomy.     Social History     Socioeconomic History    Marital status: Single     Spouse name: Not on file    Number of children: Not on file    Years of education: Not on file    Highest education level: Not on file   Occupational History    Not on file   Social Needs    Financial resource strain: Not on file    Food insecurity     Worry: Not on file     Inability: Not on file    Transportation needs     Medical: Not on file     Non-medical: Not on file Tobacco Use    Smoking status: Current Every Day Smoker     Packs/day: 0.50   Substance and Sexual Activity    Alcohol use: No    Drug use: Not Currently     Comment: currently in recovery    Sexual activity: Not on file   Lifestyle    Physical activity     Days per week: Not on file     Minutes per session: Not on file    Stress: Not on file   Relationships    Social connections     Talks on phone: Not on file     Gets together: Not on file     Attends [employer]ism service: Not on file     Active member of club or organization: Not on file     Attends meetings of clubs or organizations: Not on file     Relationship status: Not on file    Intimate partner violence     Fear of current or ex partner: Not on file     Emotionally abused: Not on file     Physically abused: Not on file     Forced sexual activity: Not on file   Other Topics Concern    Not on file   Social History Narrative    Not on file       History reviewed. No pertinent family history. Allergies:  Patient has no known allergies. Home Medications:  Prior to Admission medications    Medication Sig Start Date End Date Taking?  Authorizing Provider   ibuprofen (IBU) 800 MG tablet Take 1 tablet by mouth every 6 hours as needed for Pain 9/18/20 9/23/20 Yes Dario Marie MD   acetaminophen (TYLENOL) 500 MG tablet Take 1 tablet by mouth 4 times daily as needed for Pain 9/18/20 9/28/20 Yes Dario Marie MD   amoxicillin-clavulanate (AUGMENTIN) 875-125 MG per tablet Take 1 tablet by mouth 2 times daily for 7 days 9/15/20 9/22/20  Michele Epstein DO   naproxen (NAPROSYN) 500 MG tablet Take 500 mg by mouth 2 times daily (with meals)    Historical Provider, MD   dicyclomine (BENTYL) 10 MG capsule Take 1 capsule by mouth every 6 hours as needed (cramps) 9/5/15   Mark Reyes MD   melatonin 3 MG TABS tablet Take 3 mg by mouth daily    Historical Provider, MD   Etonogestrel (IMPLANON SC) Inject into the skin    Historical Provider, MD       REVIEW OF supple. Trachea: No tracheal deviation. Cardiovascular:      Rate and Rhythm: Normal rate and regular rhythm. Heart sounds: Normal heart sounds. No murmur. No friction rub. No gallop. Pulmonary:      Effort: Pulmonary effort is normal. No respiratory distress. Breath sounds: Normal breath sounds. No wheezing, rhonchi or rales. Abdominal:      General: There is no distension. Palpations: Abdomen is soft. Musculoskeletal: Normal range of motion. General: No swelling, deformity or signs of injury. Skin:     General: Skin is warm and dry. Capillary Refill: Capillary refill takes less than 2 seconds. Coloration: Skin is not jaundiced. Findings: No bruising or lesion. Neurological:      General: No focal deficit present. Mental Status: She is alert and oriented to person, place, and time. Mental status is at baseline. Motor: No abnormal muscle tone.          DIFFERENTIAL  DIAGNOSIS     PLAN (LABS / IMAGING / EKG):  Orders Placed This Encounter   Procedures    Culture, Blood 1    Culture, Blood 2    XR CHEST (2 VW)    CT FACIAL BONES W CONTRAST    CBC auto differential    Comprehensive Metabolic Panel w/ Reflex to MG       MEDICATIONS ORDERED:  Orders Placed This Encounter   Medications    DISCONTD: fluconazole (DIFLUCAN) tablet 150 mg    DISCONTD: ketorolac (TORADOL) injection 15 mg    DISCONTD: ketorolac (TORADOL) injection 15 mg    ibuprofen (ADVIL;MOTRIN) tablet 800 mg    hydrOXYzine (ATARAX) tablet 10 mg    iohexol (OMNIPAQUE 350) solution 75 mL    cefTRIAXone (ROCEPHIN) 1 g IVPB in 50 mL D5W minibag    ibuprofen (IBU) 800 MG tablet     Sig: Take 1 tablet by mouth every 6 hours as needed for Pain     Dispense:  20 tablet     Refill:  0    acetaminophen (TYLENOL) 500 MG tablet     Sig: Take 1 tablet by mouth 4 times daily as needed for Pain     Dispense:  40 tablet     Refill:  0       DDX: Belem Ahn is a 22 y.o. female who presents to the emergency department with left-sided facial swelling.  Differential diagnosis includes peritonsillar abscess, acute bacterial rhinosinusitis, facial cellulitis    DIAGNOSTIC RESULTS / EMERGENCY DEPARTMENT COURSE / MDM   LAB RESULTS:  Results for orders placed or performed during the hospital encounter of 09/18/20   CBC auto differential   Result Value Ref Range    WBC 7.0 3.5 - 11.3 k/uL    RBC 4.85 3.95 - 5.11 m/uL    Hemoglobin 13.8 11.9 - 15.1 g/dL    Hematocrit 43.6 36.3 - 47.1 %    MCV 89.9 82.6 - 102.9 fL    MCH 28.5 25.2 - 33.5 pg    MCHC 31.7 28.4 - 34.8 g/dL    RDW 17.0 (H) 11.8 - 14.4 %    Platelets 826 774 - 683 k/uL    MPV 11.4 8.1 - 13.5 fL    NRBC Automated 0.0 0.0 per 100 WBC    Differential Type NOT REPORTED     Seg Neutrophils 55 36 - 65 %    Lymphocytes 35 24 - 43 %    Monocytes 8 3 - 12 %    Eosinophils % 1 1 - 4 %    Basophils 1 0 - 2 %    Immature Granulocytes 0 0 %    Segs Absolute 3.90 1.50 - 8.10 k/uL    Absolute Lymph # 2.43 1.10 - 3.70 k/uL    Absolute Mono # 0.57 0.10 - 1.20 k/uL    Absolute Eos # 0.07 0.00 - 0.44 k/uL    Basophils Absolute 0.05 0.00 - 0.20 k/uL    Absolute Immature Granulocyte <0.03 0.00 - 0.30 k/uL    WBC Morphology NOT REPORTED     RBC Morphology ANISOCYTOSIS PRESENT     Platelet Estimate NOT REPORTED    Comprehensive Metabolic Panel w/ Reflex to MG   Result Value Ref Range    Glucose 81 70 - 99 mg/dL    BUN 13 6 - 20 mg/dL    CREATININE 0.54 0.50 - 0.90 mg/dL    Bun/Cre Ratio NOT REPORTED 9 - 20    Calcium 9.9 8.6 - 10.4 mg/dL    Sodium 136 135 - 144 mmol/L    Potassium 4.5 3.7 - 5.3 mmol/L    Chloride 102 98 - 107 mmol/L    CO2 23 20 - 31 mmol/L    Anion Gap 11 9 - 17 mmol/L    Alkaline Phosphatase 73 35 - 104 U/L    ALT 77 (H) 5 - 33 U/L    AST 58 (H) <32 U/L    Total Bilirubin 0.34 0.3 - 1.2 mg/dL    Total Protein 9.2 (H) 6.4 - 8.3 g/dL    Alb 4.5 3.5 - 5.2 g/dL    Albumin/Globulin Ratio 1.0 1.0 - 2.5    GFR Non-African American >60 >60 mL/min    GFR African American >60 >60 mL/min    GFR Comment          GFR Staging NOT REPORTED        IMPRESSION: Yasmine Joshi is a 22 y.o. female who presents to the emergency department with left-sided facial swelling. On examination she is tachycardic and afebrile, vital signs are otherwise unremarkable and examination demonstrates an otherwise well-appearing female of stated age who is sitting upright in the examination chair with left greater than right facial swelling and no difficulty with controlling secretions. Given the fact that she has been treated appropriately and still has worsening swelling and pain will obtain CT of the face with contrast and also obtain basic labs. If there is a leukocytosis will pursue septic work-up. RADIOLOGY:  No results found. EKG  None    All EKG's are interpreted by the Emergency Department Physician who either signs or co-signs this chart in the absence of a cardiologist.    EMERGENCY DEPARTMENT COURSE:  ED Course as of Sep 18 1825   Fri Sep 18, 2020   1357 Multiple unsuccessful IV sticks. Ultrasound to place IV. Patient anxious, agreeable to try 1 dose of Atarax. [TS]   1546 CT negative. Will provide 1 dose IV antibiotics, plan for discharge. [TS]      ED Course User Index  [TS] Janice Wong MD       PROCEDURES:  None    CONSULTS:  None    CRITICAL CARE:  Please see attending note. FINAL IMPRESSION      1.  Left-sided face pain          DISPOSITION / PLAN     DISPOSITION        PATIENT REFERRED TO:  [facility]  [address] [facility] [address] [phone]  Schedule an appointment as soon as possible for a visit in 1 week  For followup, for PCP establishment    [facility] ED  [address]  Go to   As needed, If symptoms worsen      DISCHARGE MEDICATIONS:  Discharge Medication List as of 9/18/2020  3:46 PM      START taking these medications    Details   acetaminophen (TYLENOL) 500 MG tablet Take 1 tablet by mouth 4 times daily as needed for Pain, Disp-40 tablet,R-0Print             Arthur Moreno MD  Emergency Medicine Resident    This patient was evaluated in the Emergency Department for symptoms described in the history of present illness. He/she was evaluated in the context of the global COVID-19 pandemic, which necessitated consideration that the patient might be at risk for infection with the SARS-CoV-2 virus that causes COVID-19. Institutional protocols and algorithms that pertain to the evaluation of patients at risk for COVID-19 are in a state of rapid change based on information released by regulatory bodies including the CDC and federal and state organizations. These policies and algorithms were followed during the patient's care in the ED.     (Please note that portions of thisnote were completed with a voice recognition program.  Efforts were made to edit the dictations but occasionally words are mis-transcribed.)        Arthur Moreno MD  Resident  09/18/20 [address], [place]  Resident  09/18/20 [id number]

## 2020-09-24 ENCOUNTER — TELEPHONE (OUTPATIENT)
Dept: GASTROENTEROLOGY | Age: 25
End: 2020-09-24

## 2020-09-24 ENCOUNTER — OFFICE VISIT (OUTPATIENT)
Dept: FAMILY MEDICINE CLINIC | Age: 25
End: 2020-09-24
Payer: MEDICAID

## 2020-09-24 VITALS
WEIGHT: 136 LBS | DIASTOLIC BLOOD PRESSURE: 74 MMHG | TEMPERATURE: 97.8 F | HEIGHT: 64 IN | SYSTOLIC BLOOD PRESSURE: 117 MMHG | OXYGEN SATURATION: 98 % | BODY MASS INDEX: 23.22 KG/M2 | HEART RATE: 98 BPM

## 2020-09-24 PROBLEM — G47.00 INSOMNIA: Status: ACTIVE | Noted: 2020-09-24

## 2020-09-24 PROBLEM — B18.2 CHRONIC HEPATITIS C WITHOUT HEPATIC COMA (HCC): Status: ACTIVE | Noted: 2020-09-24

## 2020-09-24 PROBLEM — F19.10 SUBSTANCE ABUSE (HCC): Status: ACTIVE | Noted: 2020-09-24

## 2020-09-24 PROBLEM — Z80.3 FAMILY HISTORY OF BREAST CANCER IN MOTHER: Status: ACTIVE | Noted: 2020-09-24

## 2020-09-24 LAB
CULTURE: NORMAL
CULTURE: NORMAL
Lab: NORMAL
Lab: NORMAL
SPECIMEN DESCRIPTION: NORMAL
SPECIMEN DESCRIPTION: NORMAL

## 2020-09-24 PROCEDURE — G8428 CUR MEDS NOT DOCUMENT: HCPCS | Performed by: STUDENT IN AN ORGANIZED HEALTH CARE EDUCATION/TRAINING PROGRAM

## 2020-09-24 PROCEDURE — G8420 CALC BMI NORM PARAMETERS: HCPCS | Performed by: STUDENT IN AN ORGANIZED HEALTH CARE EDUCATION/TRAINING PROGRAM

## 2020-09-24 PROCEDURE — 99203 OFFICE O/P NEW LOW 30 MIN: CPT | Performed by: STUDENT IN AN ORGANIZED HEALTH CARE EDUCATION/TRAINING PROGRAM

## 2020-09-24 PROCEDURE — 99211 OFF/OP EST MAY X REQ PHY/QHP: CPT | Performed by: FAMILY MEDICINE

## 2020-09-24 PROCEDURE — 4004F PT TOBACCO SCREEN RCVD TLK: CPT | Performed by: STUDENT IN AN ORGANIZED HEALTH CARE EDUCATION/TRAINING PROGRAM

## 2020-09-24 RX ORDER — BUSPIRONE HYDROCHLORIDE 10 MG/1
10 TABLET ORAL 3 TIMES DAILY
COMMUNITY
End: 2021-02-04 | Stop reason: ALTCHOICE

## 2020-09-24 RX ORDER — QUETIAPINE FUMARATE 200 MG/1
300 TABLET, FILM COATED ORAL 2 TIMES DAILY
COMMUNITY
End: 2021-11-09

## 2020-09-24 ASSESSMENT — ENCOUNTER SYMPTOMS
VOMITING: 0
COUGH: 0
CONSTIPATION: 0
DIARRHEA: 0
NAUSEA: 0
SHORTNESS OF BREATH: 0
SORE THROAT: 0
ABDOMINAL PAIN: 0
CHEST TIGHTNESS: 0

## 2020-09-24 ASSESSMENT — PATIENT HEALTH QUESTIONNAIRE - PHQ9
SUM OF ALL RESPONSES TO PHQ QUESTIONS 1-9: 0
2. FEELING DOWN, DEPRESSED OR HOPELESS: 0
SUM OF ALL RESPONSES TO PHQ9 QUESTIONS 1 & 2: 0
1. LITTLE INTEREST OR PLEASURE IN DOING THINGS: 0
SUM OF ALL RESPONSES TO PHQ QUESTIONS 1-9: 0

## 2020-09-24 NOTE — PROGRESS NOTES
Subjective:    Lenny Fairbanks is a 26 y.o. female with  has a past medical history of Anxiety, Depression, Endocarditis, Hepatitis C, and Overdose. Family History   Problem Relation Age of Onset   [facility] Breast Cancer Mother        Presented tot office today for:  Chief Complaint   Patient presents with   [facility] Doctor     here to establish care, wants to discuss hep c medication    Health Maintenance     refused, does not want needles due to being in recovery       HPI    Patient presents today to establish care. Patient states that she has a prior history of hepatitis C and would like treatment. Patient has history of substance abuse however states to be clean since July and his current living in sobriety house. Patient also complains of having family history of breast cancer where her mother passed away at age 52. Patient would like to be tested. Review of Systems   Constitutional: Negative for chills, fatigue, fever and unexpected weight change. HENT: Negative for congestion, mouth sores and sore throat. Eyes: Negative for visual disturbance. Respiratory: Negative for cough, chest tightness and shortness of breath. Cardiovascular: Negative for chest pain and leg swelling. Gastrointestinal: Negative for abdominal pain, constipation, diarrhea, nausea and vomiting. Genitourinary: Negative for difficulty urinating. Musculoskeletal: Negative for joint swelling. Skin: Negative for rash. Neurological: Negative for dizziness, weakness and headaches. Objective:    /74 (Site: Right Upper Arm, Position: Sitting, Cuff Size: Medium Adult)   Pulse 98   Temp 97.8 °F (36.6 °C)   Ht 5' 4\" (1.626 m)   Wt 136 lb (61.7 kg)   LMP 09/08/2020 (Approximate)   SpO2 98%   BMI 23.34 kg/m²    BP Readings from Last 3 Encounters:   09/24/20 117/74   09/18/20 103/63   09/15/20 118/80     Physical Exam  Vitals signs and nursing note reviewed.    Constitutional:       Appearance: She is well-developed. Cardiovascular:      Rate and Rhythm: Normal rate and regular rhythm. Heart sounds: Normal heart sounds. No murmur. No friction rub. No gallop. Pulmonary:      Effort: Pulmonary effort is normal. No respiratory distress. Breath sounds: Normal breath sounds. No wheezing or rales. Chest:      Chest wall: No tenderness. Abdominal:      General: Bowel sounds are normal. There is no distension. Palpations: Abdomen is soft. There is no mass. Tenderness: There is no abdominal tenderness. There is no guarding. Skin:     Capillary Refill: Capillary refill takes less than 2 seconds. Neurological:      Mental Status: She is alert and oriented to person, place, and time. Lab Results   Component Value Date    WBC 7.0 09/18/2020    HGB 13.8 09/18/2020    HCT 43.6 09/18/2020     09/18/2020    ALT 77 (H) 09/18/2020    AST 58 (H) 09/18/2020     09/18/2020    K 4.5 09/18/2020     09/18/2020    CREATININE 0.54 09/18/2020    BUN 13 09/18/2020    CO2 23 09/18/2020     Lab Results   Component Value Date    CALCIUM 9.9 09/18/2020    PHOS 4.0 02/04/2017     No results found for: LDLCALC, LDLCHOLESTEROL, LDLDIRECT    Assessment and Plan:    1. Chronic hepatitis C without hepatic coma [facility])  - Geoffrey Rivera MD, Gastroenterology, [facility]  - Hepatitis C RNA, Quantitative, PCR; Future  - Hepatitis C Genotyping; Future  - Protime-INR; Future  - APTT; Future  - Hepatic Function Panel; Future  - CBC With Auto Differential; Future  - TSH With Reflex Ft4; Future  - T3, Free; Future  - AFP Tumor Marker; Future  - US LIVER; Future  - Hepatitis B Surface Antibody; Future    2. Insomnia, unspecified type  -Continue with Seroquel nightly as prescribed    3.  Family history of breast cancer in mother  -Patient educated on BRCA1 and 2 gene mutation genes however explained that insurance will not cover tests due to being very expensive.  -Patient educated on screening if develops lumps or bumps in the breast.  -Patient understands and agrees    4. Substance abuse ([facility] [address]  -Patient is not on Suboxone or methadone  -Currently lives in a sober house and under drug program          Requested Prescriptions      No prescriptions requested or ordered in this encounter       There are no discontinued medications. Return in about 4 weeks (around 10/22/2020). Sharon Russell received counseling on the following healthy behaviors: nutrition and exercise  Reviewed prior labs and health maintenance  Continue current medications, diet and exercise. Discussed use, benefit, and side effects of prescribed medications. Barriers to medication compliance addressed. Patient given educational materials - see patient instructions  Was a self-tracking handout given in paper form or via [employer]? Yes    Requested Prescriptions      No prescriptions requested or ordered in this encounter       All patient questions answered. Patient voiced understanding. Quality Measures    Body mass index is 23.34 kg/m². Normal. Weight control planned discussed Healthy diet and regular exercise. BP: 117/74 Blood pressure is normal. Treatment plan consists of No treatment change needed.     No results found for: LDLCALC, LDLCHOLESTEROL, LDLDIRECT (goal LDL reduction with dx if diabetes is 50% LDL reduction)      PHQ Scores 9/24/2020   PHQ2 Score 0   PHQ9 Score 0     Interpretation of Total Score Depression Severity: 1-4 = Minimal depression, 5-9 = Mild depression, 10-14 = Moderate depression, 15-19 = Moderately severe depression, 20-27 = Severe depression

## 2020-09-24 NOTE — PROGRESS NOTES
Attending Physician Statement  I have discussed the care of Lenin Vega, including pertinent history and exam findings,  with the resident. I have reviewed the key elements of all parts of the encounter with the resident. I agree with the assessment, plan and orders as documented by the resident.   (GE Modifier)    Cheryl Desai

## 2020-09-24 NOTE — PROGRESS NOTES
Visit Information    Have you changed or started any medications since your last visit including any over-the-counter medicines, vitamins, or herbal medicines? no   Have you stopped taking any of your medications? Is so, why? -  no  Are you having any side effects from any of your medications? - no    Have you seen any other physician or provider since your last visit?  no   Have you had any other diagnostic tests since your last visit?  no   Have you been seen in the emergency room and/or had an admission in a hospital since we last saw you?  no   Have you had your routine dental cleaning in the past 6 months?  no     Do you have an active MyChart account? If no, what is the barrier?   No: pending    No care team member to display    Medical History Review  Past Medical, Family, and Social History reviewed and does not contribute to the patient presenting condition    Health Maintenance   Topic Date Due    Varicella vaccine (1 of 2 - 2-dose childhood series) 09/21/1997    Pneumococcal 0-64 years Vaccine (1 of 1 - PPSV23) 09/21/2002    HPV vaccine (1 - 2-dose series) 09/21/2007    DTaP/Tdap/Td vaccine (1 - Tdap) 09/21/2015    Cervical cancer screen  09/21/2017    Chlamydia screen  06/05/2020    Flu vaccine (1) 09/01/2020    HIV screen  Completed    Hepatitis A vaccine  Aged Out    Hepatitis B vaccine  Aged Out    Hib vaccine  Aged Out    Meningococcal (ACWY) vaccine  Aged Out

## 2020-09-28 ENCOUNTER — HOSPITAL ENCOUNTER (OUTPATIENT)
Age: 25
Setting detail: SPECIMEN
Discharge: HOME | End: 2020-09-28
Payer: MEDICAID

## 2020-09-28 LAB
ABSOLUTE EOS #: 0.12 K/UL (ref 0–0.44)
ABSOLUTE IMMATURE GRANULOCYTE: 0.04 K/UL (ref 0–0.3)
ABSOLUTE LYMPH #: 3.2 K/UL (ref 1.1–3.7)
ABSOLUTE MONO #: 0.64 K/UL (ref 0.1–1.2)
AFP: 2.3 UG/L
ALBUMIN SERPL-MCNC: 4.5 G/DL (ref 3.5–5.2)
ALBUMIN/GLOBULIN RATIO: 1 (ref 1–2.5)
ALP BLD-CCNC: 71 U/L (ref 35–104)
ALT SERPL-CCNC: 118 U/L (ref 5–33)
AST SERPL-CCNC: 72 U/L
BASOPHILS # BLD: 1 % (ref 0–2)
BASOPHILS ABSOLUTE: 0.08 K/UL (ref 0–0.2)
BILIRUB SERPL-MCNC: 0.33 MG/DL (ref 0.3–1.2)
BILIRUBIN DIRECT: 0.09 MG/DL
BILIRUBIN, INDIRECT: 0.24 MG/DL (ref 0–1)
DIFFERENTIAL TYPE: ABNORMAL
EOSINOPHILS RELATIVE PERCENT: 1 % (ref 1–4)
GLOBULIN: ABNORMAL G/DL (ref 1.5–3.8)
HBV SURFACE AB TITR SER: <3.5 MIU/ML
HCT VFR BLD CALC: 41.5 % (ref 36.3–47.1)
HEMOGLOBIN: 13.5 G/DL (ref 11.9–15.1)
IMMATURE GRANULOCYTES: 0 %
INR BLD: 0.9
LYMPHOCYTES # BLD: 34 % (ref 24–43)
MCH RBC QN AUTO: 28.8 PG (ref 25.2–33.5)
MCHC RBC AUTO-ENTMCNC: 32.5 G/DL (ref 28.4–34.8)
MCV RBC AUTO: 88.5 FL (ref 82.6–102.9)
MONOCYTES # BLD: 7 % (ref 3–12)
NRBC AUTOMATED: 0 PER 100 WBC
PARTIAL THROMBOPLASTIN TIME: 30.8 SEC (ref 20.5–30.5)
PDW BLD-RTO: 17 % (ref 11.8–14.4)
PLATELET # BLD: 403 K/UL (ref 138–453)
PLATELET ESTIMATE: ABNORMAL
PMV BLD AUTO: 10.3 FL (ref 8.1–13.5)
PROTHROMBIN TIME: 9.9 SEC (ref 9–12)
RBC # BLD: 4.69 M/UL (ref 3.95–5.11)
RBC # BLD: ABNORMAL 10*6/UL
SEG NEUTROPHILS: 57 % (ref 36–65)
SEGMENTED NEUTROPHILS ABSOLUTE COUNT: 5.47 K/UL (ref 1.5–8.1)
T3 FREE: 2.62 PG/ML (ref 2.02–4.43)
TOTAL PROTEIN: 9.2 G/DL (ref 6.4–8.3)
TSH SERPL DL<=0.05 MIU/L-ACNC: 1.13 MIU/L (ref 0.3–5)
WBC # BLD: 9.6 K/UL (ref 3.5–11.3)
WBC # BLD: ABNORMAL 10*3/UL

## 2020-10-01 LAB
DIRECT EXAM: ABNORMAL
DIRECT EXAM: ABNORMAL
Lab: ABNORMAL
SPECIMEN DESCRIPTION: ABNORMAL

## 2020-10-03 LAB
HCV QUANTITATIVE: NORMAL
HEPATITIS C GENOTYPE: NORMAL

## 2020-10-23 ENCOUNTER — OFFICE VISIT (OUTPATIENT)
Dept: FAMILY MEDICINE CLINIC | Age: 25
End: 2020-10-23
Payer: MEDICAID

## 2020-10-23 ENCOUNTER — HOSPITAL ENCOUNTER (OUTPATIENT)
Age: 25
Setting detail: SPECIMEN
Discharge: HOME | End: 2020-10-23
Payer: MEDICAID

## 2020-10-23 VITALS
HEIGHT: 63 IN | WEIGHT: 140.6 LBS | SYSTOLIC BLOOD PRESSURE: 101 MMHG | BODY MASS INDEX: 24.91 KG/M2 | HEART RATE: 118 BPM | DIASTOLIC BLOOD PRESSURE: 70 MMHG | TEMPERATURE: 97.2 F

## 2020-10-23 LAB
BILIRUBIN, POC: ABNORMAL
BLOOD URINE, POC: NEGATIVE
CLARITY, POC: ABNORMAL
COLOR, POC: ABNORMAL
GLUCOSE URINE, POC: NEGATIVE
KETONES, POC: ABNORMAL
LEUKOCYTE EST, POC: ABNORMAL
NITRITE, POC: NEGATIVE
PH, POC: 6
PROTEIN, POC: 30
SPECIFIC GRAVITY, POC: 1.02
UROBILINOGEN, POC: 1

## 2020-10-23 PROCEDURE — 81003 URINALYSIS AUTO W/O SCOPE: CPT | Performed by: STUDENT IN AN ORGANIZED HEALTH CARE EDUCATION/TRAINING PROGRAM

## 2020-10-23 PROCEDURE — 4004F PT TOBACCO SCREEN RCVD TLK: CPT | Performed by: STUDENT IN AN ORGANIZED HEALTH CARE EDUCATION/TRAINING PROGRAM

## 2020-10-23 PROCEDURE — G8420 CALC BMI NORM PARAMETERS: HCPCS | Performed by: STUDENT IN AN ORGANIZED HEALTH CARE EDUCATION/TRAINING PROGRAM

## 2020-10-23 PROCEDURE — G8427 DOCREV CUR MEDS BY ELIG CLIN: HCPCS | Performed by: STUDENT IN AN ORGANIZED HEALTH CARE EDUCATION/TRAINING PROGRAM

## 2020-10-23 PROCEDURE — 99211 OFF/OP EST MAY X REQ PHY/QHP: CPT | Performed by: FAMILY MEDICINE

## 2020-10-23 PROCEDURE — 99213 OFFICE O/P EST LOW 20 MIN: CPT | Performed by: STUDENT IN AN ORGANIZED HEALTH CARE EDUCATION/TRAINING PROGRAM

## 2020-10-23 PROCEDURE — G8484 FLU IMMUNIZE NO ADMIN: HCPCS | Performed by: STUDENT IN AN ORGANIZED HEALTH CARE EDUCATION/TRAINING PROGRAM

## 2020-10-23 ASSESSMENT — ENCOUNTER SYMPTOMS
NAUSEA: 0
COUGH: 0
VOMITING: 0
CONSTIPATION: 0
ABDOMINAL PAIN: 0
DIARRHEA: 0
SHORTNESS OF BREATH: 0
CHEST TIGHTNESS: 0
SORE THROAT: 0

## 2020-10-23 NOTE — PROGRESS NOTES
I have reviewed and discussed villela elements of Bee Mao with the resident including plan of care and follow up and agree with the care ashly plan.

## 2020-10-23 NOTE — PROGRESS NOTES
Visit Information    Have you changed or started any medications since your last visit including any over-the-counter medicines, vitamins, or herbal medicines? no   Have you stopped taking any of your medications? Is so, why? -  no  Are you having any side effects from any of your medications? - no    Have you seen any other physician or provider since your last visit?  no   Have you had any other diagnostic tests since your last visit? yes - labs    Have you been seen in the emergency room and/or had an admission in a hospital since we last saw you?  no   Have you had your routine dental cleaning in the past 6 months? Yes-      Do you have an active MyChart account? If no, what is the barrier?   No: pending     Patient Care Team:  Anupama Saucedo MD as Consulting Physician (Gastroenterology)    Medical History Review  Past Medical, Family, and Social History reviewed and does contribute to the patient presenting condition    Health Maintenance   Topic Date Due    Hepatitis A vaccine (1 of 2 - Risk 2-dose series) 09/21/1997    Varicella vaccine (1 of 2 - 2-dose childhood series) 09/21/1997    Pneumococcal 0-64 years Vaccine (1 of 1 - PPSV23) 09/21/2002    HPV vaccine (1 - 2-dose series) 09/21/2007    Hepatitis B vaccine (1 of 3 - Risk 3-dose series) 09/21/2015    DTaP/Tdap/Td vaccine (1 - Tdap) 09/21/2015    Cervical cancer screen  09/21/2017    Chlamydia screen  06/05/2020    Flu vaccine (1) 09/01/2020    HIV screen  Completed    Hib vaccine  Aged Out    Meningococcal (ACWY) vaccine  Aged Out

## 2020-10-23 NOTE — PROGRESS NOTES
Subjective:    Fidel Larsen is a 26 y.o. female with  has a past medical history of Anxiety, Depression, Endocarditis, Hepatitis C, and Overdose. Family History   Problem Relation Age of Onset   [facility] Breast Cancer Mother        Presented tot office today for:  Chief Complaint   Patient presents with    1 Month Follow-Up     Insomnia     Health Maintenance     Pt declined HM and all vaccines at this time        HPI     Chief Complaint: HEP C F/U  When did it happen? 3 yrs ago  Mechanism? Iv drug abuse  Intensity: no abd pain  Quality: n/a  Radiation:none  Getting better, worse or staying the same?m same  Treatments/Alleviating/Medications: Pt is scheduled with GI doctor on OCT 26th for further management and care      Review of Systems   Constitutional: Negative for chills, fatigue, fever and unexpected weight change. HENT: Negative for congestion, mouth sores and sore throat. Eyes: Negative for visual disturbance. Respiratory: Negative for cough, chest tightness and shortness of breath. Cardiovascular: Negative for chest pain and leg swelling. Gastrointestinal: Negative for abdominal pain, constipation, diarrhea, nausea and vomiting. Genitourinary: Negative for difficulty urinating. Musculoskeletal: Negative for joint swelling. Skin: Negative for rash. Neurological: Negative for dizziness, weakness and headaches. Objective:    /70 (Site: Left Upper Arm, Position: Sitting, Cuff Size: Medium Adult)   Pulse 118   Temp 97.2 °F (36.2 °C) (Temporal)   Ht 5' 3\" (1.6 m)   Wt 140 lb 9.6 oz (63.8 kg)   BMI 24.91 kg/m²    BP Readings from Last 3 Encounters:   10/23/20 101/70   09/24/20 117/74   09/18/20 103/63     Physical Exam  Vitals signs and nursing note reviewed. Constitutional:       Appearance: She is well-developed. Cardiovascular:      Rate and Rhythm: Normal rate and regular rhythm. Heart sounds: Normal heart sounds. No murmur. No friction rub. No gallop. Pulmonary:      Effort: Pulmonary effort is normal. No respiratory distress. Breath sounds: Normal breath sounds. No wheezing or rales. Chest:      Chest wall: No tenderness. Abdominal:      General: Bowel sounds are normal. There is no distension. Palpations: Abdomen is soft. There is no mass. Tenderness: There is no abdominal tenderness. There is no guarding. Skin:     Capillary Refill: Capillary refill takes less than 2 seconds. Neurological:      Mental Status: She is alert and oriented to person, place, and time. Lab Results   Component Value Date    WBC 9.6 09/28/2020    HGB 13.5 09/28/2020    HCT 41.5 09/28/2020     09/28/2020     (H) 09/28/2020    AST 72 (H) 09/28/2020     09/18/2020    K 4.5 09/18/2020     09/18/2020    CREATININE 0.54 09/18/2020    BUN 13 09/18/2020    CO2 23 09/18/2020    TSH 1.13 09/28/2020    INR 0.9 09/28/2020     Lab Results   Component Value Date    CALCIUM 9.9 09/18/2020    PHOS 4.0 02/04/2017     No results found for: LDLCALC, LDLCHOLESTEROL, LDLDIRECT    Assessment and Plan:    1. Chronic hepatitis C without hepatic coma ([facility] [address]  - GI appointment on OCT 26th  - All lab work completed  - Education on IV drug abuse    2. Screening for chlamydial disease  - Chlamydia/GC DNA, Urine; Future      Requested Prescriptions      No prescriptions requested or ordered in this encounter       There are no discontinued medications. Return in about 3 months (around 1/23/2021) for SCHEDULE WITH DR Gregory 37 PAP SMEAR. Sarasotaezra Hollis received counseling on the following healthy behaviors: nutrition, exercise and medication adherence  Reviewed prior labs and health maintenance  Continue current medications, diet and exercise. Discussed use, benefit, and side effects of prescribed medications. Barriers to medication compliance addressed.    Patient given educational materials - see patient instructions  Was a self-tracking handout given in paper form or via [employer]? Yes    Requested Prescriptions      No prescriptions requested or ordered in this encounter       All patient questions answered. Patient voiced understanding. Quality Measures    Body mass index is 24.91 kg/m². Normal. Weight control planned discussed Healthy diet and regular exercise. BP: 101/70 Blood pressure is normal. Treatment plan consists of No treatment change needed.     No results found for: LDLCALC, LDLCHOLESTEROL, LDLDIRECT (goal LDL reduction with dx if diabetes is 50% LDL reduction)      PHQ Scores 9/24/2020   PHQ2 Score 0   PHQ9 Score 0     Interpretation of Total Score Depression Severity: 1-4 = Minimal depression, 5-9 = Mild depression, 10-14 = Moderate depression, 15-19 = Moderately severe depression, 20-27 = Severe depression

## 2020-10-24 LAB
CULTURE: NORMAL
Lab: NORMAL
SPECIMEN DESCRIPTION: NORMAL

## 2020-10-26 ENCOUNTER — OFFICE VISIT (OUTPATIENT)
Dept: GASTROENTEROLOGY | Age: 25
End: 2020-10-26
Payer: MEDICAID

## 2020-10-26 VITALS — SYSTOLIC BLOOD PRESSURE: 112 MMHG | DIASTOLIC BLOOD PRESSURE: 81 MMHG | BODY MASS INDEX: 25.33 KG/M2 | WEIGHT: 143 LBS

## 2020-10-26 LAB
C. TRACHOMATIS DNA ,URINE: NEGATIVE
N. GONORRHOEAE DNA, URINE: NEGATIVE
SPECIMEN DESCRIPTION: NORMAL

## 2020-10-26 PROCEDURE — 4004F PT TOBACCO SCREEN RCVD TLK: CPT | Performed by: INTERNAL MEDICINE

## 2020-10-26 PROCEDURE — G8484 FLU IMMUNIZE NO ADMIN: HCPCS | Performed by: INTERNAL MEDICINE

## 2020-10-26 PROCEDURE — G8427 DOCREV CUR MEDS BY ELIG CLIN: HCPCS | Performed by: INTERNAL MEDICINE

## 2020-10-26 PROCEDURE — 99203 OFFICE O/P NEW LOW 30 MIN: CPT | Performed by: INTERNAL MEDICINE

## 2020-10-26 PROCEDURE — G8419 CALC BMI OUT NRM PARAM NOF/U: HCPCS | Performed by: INTERNAL MEDICINE

## 2020-10-26 ASSESSMENT — ENCOUNTER SYMPTOMS
ALLERGIC/IMMUNOLOGIC NEGATIVE: 1
EYES NEGATIVE: 1
GASTROINTESTINAL NEGATIVE: 1
RESPIRATORY NEGATIVE: 1

## 2020-10-26 NOTE — PROGRESS NOTES
Reason for Referral: Chronic HCV      Guillermo Hernandez MD  [address]    Chief Complaint   Patient presents with    New Patient     Referred for hep c. Patient denies any abdominal pain or fatigue. Patient states her last use was 07/2020. She is currently in a recovery facility. HISTORY OF PRESENT ILLNESS: Mikaela Mcfadden is a 26 y.o. female with a past history remarkable for endocarditis several years ago, on heart valve, referred for evaluation of chronic HCV      Smoker: Active smoker, 5 cig/day, x 8 yrs. Drinking history: socially  Illicit drugs: Heroin IV, crystal meth. Colleen Pagoda in the past.  Last use of IV drugs approximately 3 months ago  Abdominal surgeries: No abdominal surgeries  Prior Colonoscopy: N  Prior EGD:N  FH of GI issues: None      Past Medical,Family, and Social History reviewed and does contribute to the patient presentingcondition. Patient's PMH/PSH,SH,PSYCH Hx, MEDs, ALLERGIES, and ROS were all reviewed and updated in the appropriate sections.     PAST MEDICAL HISTORY:  Past Medical History:   Diagnosis Date    Anxiety     Depression     Endocarditis     Hepatitis C     Overdose        Past Surgical History:   Procedure Laterality Date    ADENOIDECTOMY         CURRENT MEDICATIONS:    Current Outpatient Medications:     busPIRone (BUSPAR) 10 MG tablet, Take 10 mg by mouth 3 times daily, Disp: , Rfl:     QUEtiapine (SEROQUEL) 200 MG tablet, Take 200 mg by mouth 2 times daily, Disp: , Rfl:     naproxen (NAPROSYN) 500 MG tablet, Take 500 mg by mouth 2 times daily (with meals), Disp: , Rfl:     dicyclomine (BENTYL) 10 MG capsule, Take 1 capsule by mouth every 6 hours as needed (cramps), Disp: 20 capsule, Rfl: 0    melatonin 3 MG TABS tablet, Take 3 mg by mouth daily, Disp: , Rfl:     Etonogestrel (IMPLANON SC), Inject into the skin, Disp: , Rfl:     ibuprofen (IBU) 800 MG tablet, Take 1 tablet by mouth every 6 hours as needed for Pain, Disp: 20 tablet, Rfl: 0    acetaminophen (TYLENOL) 500 MG tablet, Take 1 tablet by mouth 4 times daily as needed for Pain (Patient not taking: Reported on 9/24/2020), Disp: 40 tablet, Rfl: 0    ALLERGIES:   No Known Allergies    FAMILY HISTORY:       Problem Relation Age of Onset    Breast Cancer Mother          SOCIAL HISTORY:   Social History     Socioeconomic History    Marital status: Single     Spouse name: Not on file    Number of children: Not on file    Years of education: Not on file    Highest education level: Not on file   Occupational History    Not on file   Social Needs    Financial resource strain: Not on file    Food insecurity     Worry: Not on file     Inability: Not on file   [employer] needs     Medical: Not on file     Non-medical: Not on file   Tobacco Use    Smoking status: Current Every Day Smoker     Packs/day: 0.25     Types: Cigarettes    Smokeless tobacco: Never Used   Substance and Sexual Activity    Alcohol use: No    Drug use: Not Currently     Comment: currently in recovery. Last use 07/24/2020.  Sexual activity: Not on file   Lifestyle    Physical activity     Days per week: Not on file     Minutes per session: Not on file    Stress: Not on file   Relationships    Social connections     Talks on phone: Not on file     Gets together: Not on file     Attends Zoroastrian service: Not on file     Active member of club or organization: Not on file     Attends meetings of clubs or organizations: Not on file     Relationship status: Not on file    Intimate partner violence     Fear of current or ex partner: Not on file     Emotionally abused: Not on file     Physically abused: Not on file     Forced sexual activity: Not on file   Other Topics Concern    Not on file   Social History Narrative    Not on file         REVIEW OF SYSTEMS: A 12-point review of systems was obtained and pertinent positives and negatives were listed below.      REVIEW OF SYSTEMS: Constitutional: No fever, no chills, no lethargy, no weakness. HEENT:  No headache, otalgia, itchy eyes, nasal discharge or sore throat. Cardiac:  No chest pain, dyspnea, orthopnea or PND. Chest:   No cough, phlegm or wheezing. Abdomen:      Detailed by MA   Neuro:  No focal weakness, abnormal movements or seizure like activity. Skin:   No rashes, no itching. :   No hematuria, no pyuria, no dysuria, no flank pain. Extremities:  No swelling or joint pains. ROS was otherwise negative    Review of Systems   Constitutional: Negative. HENT: Negative. Eyes: Negative. Respiratory: Negative. Cardiovascular: Negative. Gastrointestinal: Negative. Endocrine: Negative. Genitourinary: Negative. Musculoskeletal: Negative. Skin: Negative. Allergic/Immunologic: Negative. Neurological: Negative. Hematological: Negative. Psychiatric/Behavioral: Negative. All other systems reviewed and are negative. PHYSICAL EXAMINATION: Vital signs reviewed per the nursing documentation. /81   Wt 143 lb (64.9 kg)   BMI 25.33 kg/m²   Body mass index is 25.33 kg/m². Physical Exam    Physical Exam   Constitutional: Patient is oriented to person, place, and time. Patient appears well-developed and well-nourished. HENT:   Head: Normocephalic and atraumatic. Eyes: Pupils are equal, round, and reactive to light. EOM are normal.   Neck: Normal range of motion. Neck supple. No JVD present. No tracheal deviation present. No thyromegaly present. Cardiovascular: Normal rate, regular rhythm, normal heart sounds and intact distal pulses. Pulmonary/Chest: Effort normal and breath sounds normal. No stridor. No respiratory distress. He has no wheezes. He has no rales. He exhibits no tenderness. Abdominal: Soft. Bowel sounds are normal. He exhibits no distension and no mass. There is no tenderness. There is no rebound and no guarding. No hernia.    Musculoskeletal: Normal range of Thank you for allowing me to participate in the care of Ms. Boyd. For any further questions please do not hesitate to contact me. I have reviewed and agree with the MA/LPN ROS please refer to their documentation from today's encounter on a separate note. Sayra Torres MD, MPH   [facility] [place] [facility]  Office #: [phone]          this note is created with the assistance of a speech recognition program.  While intending to generate a document that actually reflects the content of the visit, the document can still have some errors including those of syntax and sound a like substitutions which may escape proof reading. It such instances, actual meaning can be extrapolated by contextual diversion.

## 2020-11-05 ENCOUNTER — HOSPITAL ENCOUNTER (OUTPATIENT)
Dept: ULTRASOUND IMAGING | Age: 25
Discharge: HOME | End: 2020-11-07
Payer: MEDICAID

## 2020-11-05 PROCEDURE — 76705 ECHO EXAM OF ABDOMEN: CPT

## 2020-11-06 ENCOUNTER — HOSPITAL ENCOUNTER (EMERGENCY)
Age: 25
Discharge: HOME | End: 2020-11-06
Attending: EMERGENCY MEDICINE
Payer: MEDICAID

## 2020-11-06 VITALS — TEMPERATURE: 96.9 F | BODY MASS INDEX: 25.33 KG/M2 | WEIGHT: 143 LBS

## 2020-11-06 PROCEDURE — 6370000000 HC RX 637 (ALT 250 FOR IP): Performed by: STUDENT IN AN ORGANIZED HEALTH CARE EDUCATION/TRAINING PROGRAM

## 2020-11-06 PROCEDURE — 99283 EMERGENCY DEPT VISIT LOW MDM: CPT

## 2020-11-06 RX ORDER — NAPROXEN 500 MG/1
500 TABLET ORAL 2 TIMES DAILY WITH MEALS
Qty: 20 TABLET | Refills: 0 | Status: SHIPPED | OUTPATIENT
Start: 2020-11-06 | End: 2020-12-03

## 2020-11-06 RX ORDER — PENICILLIN V POTASSIUM 500 MG/1
500 TABLET ORAL 4 TIMES DAILY
Qty: 28 TABLET | Refills: 0 | Status: SHIPPED | OUTPATIENT
Start: 2020-11-06 | End: 2020-11-13

## 2020-11-06 RX ORDER — ACETAMINOPHEN 500 MG
1000 TABLET ORAL ONCE
Status: COMPLETED | OUTPATIENT
Start: 2020-11-06 | End: 2020-11-06

## 2020-11-06 RX ORDER — PENICILLIN V POTASSIUM 250 MG/1
500 TABLET ORAL ONCE
Status: COMPLETED | OUTPATIENT
Start: 2020-11-06 | End: 2020-11-06

## 2020-11-06 RX ADMIN — PENICILLIN V POTASSIUM 500 MG: 250 TABLET ORAL at 09:06

## 2020-11-06 RX ADMIN — ACETAMINOPHEN 1000 MG: 500 TABLET ORAL at 09:06

## 2020-11-06 ASSESSMENT — ENCOUNTER SYMPTOMS
SHORTNESS OF BREATH: 0
ABDOMINAL PAIN: 0
PHOTOPHOBIA: 0

## 2020-11-06 ASSESSMENT — PAIN SCALES - GENERAL: PAINLEVEL_OUTOF10: 7

## 2020-11-06 NOTE — ED NOTES
[facility]  [address] [facility]  Phone: [phone]  Fax: [phone]    Patient Referral Fax     To:  Patient Referral Coordinator Fax:  [phone]  Referral from:  [address] [place] [address] Dr Brice [email or web address]      [phone] (home)      Additional Notes: Tuesday, November 10th    Signature:  Andriy Culver Date: 11/6/20           Andriy Culver RN  11/06/20 1024

## 2020-11-06 NOTE — ED PROVIDER NOTES
[facility] ED     Emergency Department     Faculty Attestation        I performed a history and physical examination of the patient and discussed management with the resident. I reviewed the residents note and agree with the documented findings and plan of care. Any areas of disagreement are noted on the chart. I was personally present for the key portions of any procedures. I have documented in the chart those procedures where I was not present during the key portions. I have reviewed the emergency nurses triage note. I agree with the chief complaint, past medical history, past surgical history, allergies, medications, social and family history as documented unless otherwise noted below. For mid-level providers such as nurse practitioners as well as physicians assistants:    I have personally seen and evaluated the patient. I find the patient's history and physical exam are consistent with NP/PA documentation. I agree with the care provided, treatment rendered, disposition, & follow-up plan. Additional findings are as noted. Vital Signs: Temp 96.9 °F (36.1 °C) (Temporal)   PCP:  Leesa Kiran MD    Pertinent Comments:     Dental pain no swelling the tongue, lips or uvula no abscess. No submandibular swelling. Neck is soft and supple with no meningeal signs.       Critical Care  None          Fabiola Mejia MD  Attending Emergency Medicine Physician              Emerald Mariscal MD  11/06/20 [id number]

## 2020-11-06 NOTE — ED PROVIDER NOTES
organization: Not on file     Attends meetings of clubs or organizations: Not on file     Relationship status: Not on file    Intimate partner violence     Fear of current or ex partner: Not on file     Emotionally abused: Not on file     Physically abused: Not on file     Forced sexual activity: Not on file   Other Topics Concern    Not on file   Social History Narrative    Not on file       Family History   Problem Relation Age of Onset    Breast Cancer Mother        Allergies:  Patient has no known allergies. Home Medications:  Prior to Admission medications    Medication Sig Start Date End Date Taking? Authorizing Provider   busPIRone (BUSPAR) 10 MG tablet Take 10 mg by mouth 3 times daily    Historical Provider, MD   QUEtiapine (SEROQUEL) 200 MG tablet Take 200 mg by mouth 2 times daily    Historical Provider, MD   ibuprofen (IBU) 800 MG tablet Take 1 tablet by mouth every 6 hours as needed for Pain 9/18/20 9/23/20  Uzair Robbins MD   acetaminophen (TYLENOL) 500 MG tablet Take 1 tablet by mouth 4 times daily as needed for Pain  Patient not taking: Reported on 9/24/2020 9/18/20 9/28/20  Uzair Robbins MD   naproxen (NAPROSYN) 500 MG tablet Take 500 mg by mouth 2 times daily (with meals)    Historical Provider, MD   dicyclomine (BENTYL) 10 MG capsule Take 1 capsule by mouth every 6 hours as needed (cramps) 9/5/15   Diamond Chaidez MD   melatonin 3 MG TABS tablet Take 3 mg by mouth daily    Historical Provider, MD   Etonogestrel (IMPLANON SC) Inject into the skin    Historical Provider, MD       REVIEW OF SYSTEMS    (2-9 systems for level 4, 10 or more for level 5)      Review of Systems   Constitutional: Positive for appetite change. HENT: Positive for dental problem. Eyes: Negative for photophobia. Respiratory: Negative for shortness of breath. Cardiovascular: Negative for chest pain. Gastrointestinal: Negative for abdominal pain. Genitourinary: Negative for difficulty urinating. Musculoskeletal: Negative for gait problem. Skin: Negative for pallor. Neurological: Positive for headaches. Psychiatric/Behavioral: Negative for agitation. PHYSICAL EXAM   (up to 7 for level 4, 8 or more for level 5)      INITIAL VITALS:   Temp 96.9 °F (36.1 °C) (Temporal)   Wt 143 lb (64.9 kg)   BMI 25.33 kg/m²     Physical Exam  Vitals signs and nursing note reviewed. Constitutional:       General: She is not in acute distress. Appearance: Normal appearance. She is not ill-appearing, toxic-appearing or diaphoretic. HENT:      Head: Normocephalic and atraumatic. Comments: Elevation of early mouth no drainable abscess no overlying cellulitis no  exudates uvula is midline palate raises symmetrically     Right Ear: External ear normal.      Left Ear: External ear normal.      Nose: Nose normal.      Mouth/Throat:      Mouth: Mucous membranes are moist.   Eyes:      Conjunctiva/sclera: Conjunctivae normal.   Neck:      Musculoskeletal: Normal range of motion. No neck rigidity. Cardiovascular:      Rate and Rhythm: Normal rate. Pulmonary:      Effort: Pulmonary effort is normal. No respiratory distress. Breath sounds: Normal breath sounds. Abdominal:      Palpations: Abdomen is soft. Tenderness: There is no abdominal tenderness. Musculoskeletal: Normal range of motion. Right lower leg: No edema. Left lower leg: No edema. Skin:     General: Skin is warm. Capillary Refill: Capillary refill takes less than 2 seconds. Neurological:      General: No focal deficit present. Mental Status: She is alert and oriented to person, place, and time. Psychiatric:         Mood and Affect: Mood normal.         Behavior: Behavior normal.         DIFFERENTIAL  DIAGNOSIS     PLAN (LABS / IMAGING / EKG):  No orders of the defined types were placed in this encounter.       MEDICATIONS ORDERED:  Orders Placed This Encounter   Medications    penicillin v potassium (VEETID) tablet 500 mg    acetaminophen (TYLENOL) tablet 1,000 mg    naproxen (NAPROSYN) 500 MG tablet     Sig: Take 1 tablet by mouth 2 times daily (with meals)     Dispense:  20 tablet     Refill:  0    penicillin v potassium (VEETID) 500 MG tablet     Sig: Take 1 tablet by mouth 4 times daily for 7 days     Dispense:  28 tablet     Refill:  0       DDX: dental infection    DIAGNOSTIC RESULTS / EMERGENCY DEPARTMENT COURSE / MDM   LAB RESULTS:  No results found for this visit on 11/06/20. IMPRESSION: Toxic 78-year-old female no acute distress complaining of right-sided dental pain no constitutional symptoms no alarm symptoms or red flags plan will be analgesia antibiotics and discharged home with the same and instructions for dental follow-up. RADIOLOGY:  none    EKG      All EKG's are interpreted by the Emergency Department Physician who either signs or Co-signs this chart in the absence of a cardiologist.    EMERGENCY DEPARTMENT COURSE:  Seen and evaluated history physical exam signs and symptoms are consistent with a dental abscess no drainable abscess identified patient provided analgesia and started on penicillin discharged home with the same but dental follow-up    PROCEDURES:  none    CONSULTS:  None    CRITICAL CARE:  Please see attending note    FINAL IMPRESSION      1. Dental infection          DISPOSITION / PLAN     DISPOSITION  Discharged home with PCP and dental follow-up.       PATIENT REFERRED TO:  Sandra Rodriguez MD  [address] [place] [address]  [phone]    Call today  for followup in 1-2 days    [facility]  [address] [facility] [id number] [phone]  Go to   As needed, If symptoms worsen    [facility]  [address] [facility]  [phone]  Call today        DISCHARGE MEDICATIONS:  Discharge Medication List as of 11/6/2020  8:48 AM      START taking these medications    Details   penicillin v potassium (VEETID) 500 MG tablet Take 1 tablet by mouth 4 times daily for 7 days, Disp-28 tablet,R-0Print             Shin Pat DO  Emergency Medicine Resident    (Please note that portions of thisnote were completed with a voice recognition program.  Efforts were made to edit the dictations but occasionally words are mis-transcribed.)        Shin Pat DO  Resident  11/06/20 1500 Elvin Amezcua DO  Resident  11/06/20 1244

## 2020-11-16 ENCOUNTER — HOSPITAL ENCOUNTER (OUTPATIENT)
Age: 25
Setting detail: SPECIMEN
Discharge: HOME | End: 2020-11-16
Payer: MEDICAID

## 2020-11-16 ENCOUNTER — OFFICE VISIT (OUTPATIENT)
Dept: FAMILY MEDICINE CLINIC | Age: 25
End: 2020-11-16
Payer: MEDICAID

## 2020-11-16 VITALS
BODY MASS INDEX: 25.48 KG/M2 | DIASTOLIC BLOOD PRESSURE: 62 MMHG | HEART RATE: 86 BPM | HEIGHT: 63 IN | SYSTOLIC BLOOD PRESSURE: 100 MMHG | WEIGHT: 143.8 LBS | TEMPERATURE: 96.9 F

## 2020-11-16 PROCEDURE — 99395 PREV VISIT EST AGE 18-39: CPT | Performed by: STUDENT IN AN ORGANIZED HEALTH CARE EDUCATION/TRAINING PROGRAM

## 2020-11-16 ASSESSMENT — ENCOUNTER SYMPTOMS
SHORTNESS OF BREATH: 0
ABDOMINAL PAIN: 0
CONSTIPATION: 0
WHEEZING: 0
DIARRHEA: 0

## 2020-11-16 NOTE — PROGRESS NOTES
Visit Information    Have you changed or started any medications since your last visit including any over-the-counter medicines, vitamins, or herbal medicines? yes - penicillin   Have you stopped taking any of your medications? Is so, why? -  yes - see list  Are you having any side effects from any of your medications? - no    Have you seen any other physician or provider since your last visit?  no   Have you had any other diagnostic tests since your last visit?  no   Have you been seen in the emergency room and/or had an admission in a hospital since we last saw you?  no   Have you had your routine dental cleaning in the past 6 months? 11/2020    Do you have an active MyChart account? If no, what is the barrier?   No:     Patient Care Team:  Sandra Rodriguez MD as PCP - General (Family Medicine)  Sayra Torres MD as Consulting Physician (Gastroenterology)    Medical History Review  Past Medical, Family, and Social History reviewed and does not contribute to the patient presenting condition    Health Maintenance   Topic Date Due    Hepatitis A vaccine (1 of 2 - Risk 2-dose series) 09/21/1997    Varicella vaccine (1 of 2 - 2-dose childhood series) 09/21/1997    Pneumococcal 0-64 years Vaccine (1 of 1 - PPSV23) 09/21/2002    HPV vaccine (1 - 2-dose series) 09/21/2007    Hepatitis B vaccine (1 of 3 - Risk 3-dose series) 09/21/2015    DTaP/Tdap/Td vaccine (1 - Tdap) 09/21/2015    Cervical cancer screen  09/21/2017    Flu vaccine (1) 09/01/2020    Chlamydia screen  10/23/2021    HIV screen  Completed    Hib vaccine  Aged Out    Meningococcal (ACWY) vaccine  Aged Out

## 2020-11-16 NOTE — PROGRESS NOTES
Attending Physician Statement  I have discussed the care of Norita Ahumada 26 y.o. female, including pertinent history and exam findings, with the resident Dr. Tony Rodriguez MD.    History and Exam:   Chief Complaint   Patient presents with    Gynecologic Exam     pap     Health Maintenance     declined hepa, varicella, pneumo, hpv, hep b, tdap, and flu vaccine     Discuss Medications     pt is taking penicillin      Past Medical History:   Diagnosis Date    Anxiety     Depression     Endocarditis     Hepatitis C     Overdose      No Known Allergies   I have seen and examined the patient and the key elements of the encounter have been performed by me. BP Readings from Last 3 Encounters:   11/16/20 100/62   10/26/20 112/81   10/23/20 101/70     /62 (Site: Left Upper Arm, Position: Sitting, Cuff Size: Medium Adult) Comment: machine  Pulse 86   Temp 96.9 °F (36.1 °C) (Temporal)   Ht 5' 3\" (1.6 m)   Wt 143 lb 12.8 oz (65.2 kg)   LMP 11/01/2020 (Approximate)   BMI 25.47 kg/m²   Lab Results   Component Value Date    WBC 9.6 09/28/2020    HGB 13.5 09/28/2020    HCT 41.5 09/28/2020     09/28/2020     (H) 09/28/2020    AST 72 (H) 09/28/2020     09/18/2020    K 4.5 09/18/2020     09/18/2020    CREATININE 0.54 09/18/2020    BUN 13 09/18/2020    CO2 23 09/18/2020    TSH 1.13 09/28/2020    INR 0.9 09/28/2020     Lab Results   Component Value Date    LABALBU 4.5 09/28/2020     Lab Results   Component Value Date    FERRITIN 760 (H) 02/05/2017     No results found for: LDLCALC, LDLCHOLESTEROL, LDLDIRECT  I agree with the assessment, plan and the diagnosis of    Diagnosis Orders   1. Well woman exam     2. Cervical cancer screening  PAP Smear    . I agree with orders as documented by the resident. Recommendations:  Pap smear/well woman exam    More than 25 minutes spent  in face to face encounter with the patient and more than half in counseling.  Patient's questions were answered. Patient Voiced understanding to the counseling. Return if symptoms worsen or fail to improve.    (GC Modifier)-Dr. Sakina Cage MD

## 2020-11-16 NOTE — PROGRESS NOTES
Subjective:    Deborah Jay is a 26 y.o. female with  has a past medical history of Anxiety, Depression, Endocarditis, Hepatitis C, and Overdose. Presented to the office today for:  Chief Complaint   Patient presents with    Gynecologic Exam     pap     Health Maintenance     declined hepa, varicella, pneumo, hpv, hep b, tdap, and flu vaccine     Discuss Medications     pt is taking penicillin        HPI    Patient is a 36-year-old female here today for cervical cancer screening. This is patient's first Pap smear. Patient denies any vaginal discharge, urinary symptoms, or foul-smelling discharge. Patient states she is not currently sexually active due to living in enclosed housing facility. Patient has no worries for STDs today. Patient has no further concerns. Review of Systems   Constitutional: Negative for activity change, appetite change, chills and fever. Respiratory: Negative for shortness of breath and wheezing. Cardiovascular: Negative for chest pain, palpitations and leg swelling. Gastrointestinal: Negative for abdominal pain, constipation and diarrhea. Genitourinary: Negative for difficulty urinating. Neurological: Negative for dizziness, weakness, numbness and headaches. The patient has a   Family History   Problem Relation Age of Onset    Breast Cancer Mother        Objective:    /62 (Site: Left Upper Arm, Position: Sitting, Cuff Size: Medium Adult) Comment: machine  Pulse 86   Temp 96.9 °F (36.1 °C) (Temporal)   Ht 5' 3\" (1.6 m)   Wt 143 lb 12.8 oz (65.2 kg)   LMP 11/01/2020 (Approximate)   BMI 25.47 kg/m²    BP Readings from Last 3 Encounters:   11/16/20 100/62   10/26/20 112/81   10/23/20 101/70       Physical Exam  Vitals signs reviewed. Exam conducted with a chaperone present. Constitutional:       Appearance: Normal appearance. Cardiovascular:      Rate and Rhythm: Normal rate and regular rhythm. Pulses: Normal pulses.       Heart sounds: Normal heart sounds. Pulmonary:      Effort: Pulmonary effort is normal.      Breath sounds: Normal breath sounds. No wheezing. Genitourinary:     General: Normal vulva. Labia:         Right: No rash. Left: No rash. Vagina: Normal. No vaginal discharge, tenderness or bleeding. Cervix: No cervical motion tenderness, discharge, friability or cervical bleeding. Neurological:      Mental Status: She is alert. Lab Results   Component Value Date    WBC 9.6 09/28/2020    HGB 13.5 09/28/2020    HCT 41.5 09/28/2020     09/28/2020     (H) 09/28/2020    AST 72 (H) 09/28/2020     09/18/2020    K 4.5 09/18/2020     09/18/2020    CREATININE 0.54 09/18/2020    BUN 13 09/18/2020    CO2 23 09/18/2020    TSH 1.13 09/28/2020    INR 0.9 09/28/2020     Lab Results   Component Value Date    CALCIUM 9.9 09/18/2020    PHOS 4.0 02/04/2017     No results found for: LDLCALC, LDLCHOLESTEROL, LDLDIRECT    Assessment and Plan:    1. Cervical cancer screening  We will call patient with results   - PAP Smear; Future          Requested Prescriptions      No prescriptions requested or ordered in this encounter       There are no discontinued medications. Renay Roach received counseling on the following healthy behaviors: nutrition, exercise and medication adherence    Discussed use,benefit, and side effects of prescribed medications. Barriers to medication compliance addressed. All patient questions answered. Pt voiced understanding. Return if symptoms worsen or fail to improve. Disclaimer: Some orall of this note was transcribed using voice-recognition software. This may cause typographical errors occasionally. Although all effort is made to fix these errors, please do not hesitate to contact our office if there [facility] concern with the understanding of this note.

## 2020-11-23 ENCOUNTER — TELEPHONE (OUTPATIENT)
Dept: ADMINISTRATIVE | Age: 25
End: 2020-11-23

## 2020-11-23 NOTE — TELEPHONE ENCOUNTER
Pt needs an appointment to be checked for pregnancy and counseling before I can prescribe medication

## 2020-12-01 ENCOUNTER — OFFICE VISIT (OUTPATIENT)
Dept: FAMILY MEDICINE CLINIC | Age: 25
End: 2020-12-01
Payer: MEDICARE

## 2020-12-01 VITALS
HEIGHT: 64 IN | DIASTOLIC BLOOD PRESSURE: 76 MMHG | SYSTOLIC BLOOD PRESSURE: 108 MMHG | HEART RATE: 104 BPM | BODY MASS INDEX: 24.75 KG/M2 | WEIGHT: 145 LBS | TEMPERATURE: 97.3 F

## 2020-12-01 PROCEDURE — 4004F PT TOBACCO SCREEN RCVD TLK: CPT | Performed by: STUDENT IN AN ORGANIZED HEALTH CARE EDUCATION/TRAINING PROGRAM

## 2020-12-01 PROCEDURE — G8420 CALC BMI NORM PARAMETERS: HCPCS | Performed by: STUDENT IN AN ORGANIZED HEALTH CARE EDUCATION/TRAINING PROGRAM

## 2020-12-01 PROCEDURE — G8484 FLU IMMUNIZE NO ADMIN: HCPCS | Performed by: STUDENT IN AN ORGANIZED HEALTH CARE EDUCATION/TRAINING PROGRAM

## 2020-12-01 PROCEDURE — G8428 CUR MEDS NOT DOCUMENT: HCPCS | Performed by: STUDENT IN AN ORGANIZED HEALTH CARE EDUCATION/TRAINING PROGRAM

## 2020-12-01 PROCEDURE — 99213 OFFICE O/P EST LOW 20 MIN: CPT | Performed by: STUDENT IN AN ORGANIZED HEALTH CARE EDUCATION/TRAINING PROGRAM

## 2020-12-01 RX ORDER — NORGESTIMATE AND ETHINYL ESTRADIOL 7DAYSX3 28
KIT ORAL
Qty: 28 TABLET | Refills: 3 | Status: SHIPPED | OUTPATIENT
Start: 2020-12-01 | End: 2021-07-15 | Stop reason: SDUPTHER

## 2020-12-01 ASSESSMENT — ENCOUNTER SYMPTOMS
SHORTNESS OF BREATH: 0
ABDOMINAL PAIN: 0
DIARRHEA: 0
VOMITING: 0
NAUSEA: 0

## 2020-12-01 NOTE — PROGRESS NOTES
Subjective:    Nakul Estrada is a 26 y.o. female with  has a past medical history of Anxiety, Depression, Endocarditis, Hepatitis C, and Overdose. Presented to the office today for:  Chief Complaint   Patient presents with    Discuss Medications    Health Maintenance     pt refuse       HPI    26year old female here to discuss birth control. No acute concerns today. Would like to start birth control pills. Was previously on implanon. Pregnancy test negative today. Counseled on proper use of birth control pills and possible adverse effects. Counseled on smoking cessation. Review of Systems   Constitutional: Negative for chills and fever. Respiratory: Negative for shortness of breath. Cardiovascular: Negative for chest pain. Gastrointestinal: Negative for abdominal pain, diarrhea, nausea and vomiting. The patient has a   Family History   Problem Relation Age of Onset    Breast Cancer Mother        Objective:    /76 (Site: Right Upper Arm, Position: Sitting, Cuff Size: Medium Adult)   Pulse 104   Temp 97.3 °F (36.3 °C) (Temporal)   Ht 5' 4\" (1.626 m)   Wt 145 lb (65.8 kg)   LMP 11/01/2020 (Approximate)   BMI 24.89 kg/m²    BP Readings from Last 3 Encounters:   12/01/20 108/76   11/16/20 100/62   10/26/20 112/81       Physical Exam  Vitals signs reviewed. HENT:      Head: Normocephalic and atraumatic. Cardiovascular:      Rate and Rhythm: Normal rate and regular rhythm. Pulses: Normal pulses. Heart sounds: Normal heart sounds. Pulmonary:      Effort: Pulmonary effort is normal. No respiratory distress. Breath sounds: Normal breath sounds. No wheezing. Abdominal:      Palpations: Abdomen is soft. Tenderness: There is no abdominal tenderness. There is no guarding. Skin:     General: Skin is warm and dry. Neurological:      General: No focal deficit present. Mental Status: She is alert and oriented to person, place, and time.          Lab Results Component Value Date    WBC 9.6 09/28/2020    HGB 13.5 09/28/2020    HCT 41.5 09/28/2020     09/28/2020     (H) 09/28/2020    AST 72 (H) 09/28/2020     09/18/2020    K 4.5 09/18/2020     09/18/2020    CREATININE 0.54 09/18/2020    BUN 13 09/18/2020    CO2 23 09/18/2020    TSH 1.13 09/28/2020    INR 0.9 09/28/2020     Lab Results   Component Value Date    CALCIUM 9.9 09/18/2020    PHOS 4.0 02/04/2017     No results found for: LDLCALC, LDLCHOLESTEROL, LDLDIRECT    Assessment and Plan:    1. Encounter for female birth control  - counseled on appropriate use and possible adverse effects  - Norgestim-Eth Estrad Triphasic (ORTHO TRI-CYCLEN, 28,) 0.18/0.215/0.25 MG-35 MCG TABS; Use 1 tablet daily as directed. Dispense: 28 tablet; Refill: 3    2. Acne, unspecified acne type  - Norgestim-Eth Estrad Triphasic (ORTHO TRI-CYCLEN, 28,) 0.18/0.215/0.25 MG-35 MCG TABS; Use 1 tablet daily as directed. Dispense: 28 tablet; Refill: 3    3. Tobacco use  - counseled on smoking cessation  - currently 4 cigarettes daily          Requested Prescriptions     Signed Prescriptions Disp Refills    Norgestim-Eth Estrad Triphasic (ORTHO TRI-CYCLEN, 28,) 0.18/0.215/0.25 MG-35 MCG TABS 28 tablet 3     Sig: Use 1 tablet daily as directed. Medications Discontinued During This Encounter   Medication Reason    Etonogestrel (IMPLANON SC) Therapy completed       Navya Julian received counseling on the following healthy behaviors: nutrition, exercise and medication adherence    Discussed use,benefit, and side effects of prescribed medications. Barriers to medication compliance addressed. All patient questions answered. Pt voiced understanding. Return in about 3 months (around 3/1/2021). Disclaimer: Some orall of this note was transcribed using voice-recognition software. This may cause typographical errors occasionally.  Although all effort is made to fix these errors, please do not hesitate to contact our office if there isany concern with the understanding of this note.

## 2020-12-01 NOTE — PATIENT INSTRUCTIONS
Patient Education        Combination Birth Control Pills: Care Instructions  Your Care Instructions     Combination birth control pills are used to prevent pregnancy. They give you a regular dose of the hormones estrogen and progestin. You take a hormone pill every day to prevent pregnancy. Birth control pills come in packs. The most common type has 3 weeks of hormone pills. Some packs have sugar pills (they do not contain any hormones) for the fourth week. During that fourth no-hormone week, you have your period. After the fourth week (28 days), you start a new pack. Some birth control pills are packaged in different ways. For example, some have hormone pills for the fourth week instead of sugar pills. Taking hormones for the entire month causes you to not have periods or to have fewer periods. Others are packaged so that you have a period every 3 months. Your doctor will tell you what type of pills you have. Follow-up care is a key part of your treatment and safety. Be sure to make and go to all appointments, and call your doctor if you are having problems. It's also a good idea to know your test results and keep a list of the medicines you take. How can you care for yourself at home? How do you take the pill? · Follow your doctor's instructions about when to start taking your pills. Use backup birth control, such as a condom, or don't have intercourse for 7 days after you start your pills. · Take your pills every day, at about the same time of day. To help yourself do this, try to take them when you do something else every day, such as brushing your teeth. What if you forget to take a pill? Always read the label for specific instructions, or call your doctor. Here are some basic guidelines:  · If you miss 1 hormone pill, take it as soon as you remember. Ask your doctor if you may need to use a backup birth control method, such as a condom, or not have intercourse.   · If you miss 2 or more hormone pills, take one as soon as you remember you forgot them. Then read the pill label or call your doctor about instructions on how to take your missed pills. Use a backup method of birth control or don't have intercourse for 7 days. Pregnancy is more likely if you miss more than 1 pill. · If you had intercourse, you can use emergency contraception to help prevent pregnancy. The most effective emergency contraception is the copper IUD (inserted by a doctor). You can also get emergency contraceptive pills without a prescription at most drugstores. What else do you need to know? · The pill can have side effects. ? You may have very light or skipped periods. ? You may have bleeding between periods (spotting). This usually decreases after 3 to 4 months. ? You may have mood changes, less interest in sex, or weight gain. · The pill may reduce acne, heavy bleeding and cramping, and symptoms of premenstrual syndrome. · Check with your doctor before you use any other medicines, including over-the-counter medicines, vitamins, herbal products, and supplements. Birth control hormones may not work as well to prevent pregnancy when combined with other medicines. · The pill doesn't protect against sexually transmitted infection (STIs), such as herpes or HIV/AIDS. If you're not sure whether your sex partner might have an STI, use a condom to protect against disease. When should you call for help? Call your doctor now or seek immediate medical care if:    · You have severe belly pain.     · You have signs of a blood clot, such as:  ? Pain in your calf, back of the knee, thigh, or groin. ? Redness and swelling in your leg or groin.     · You have blurred vision or other problems seeing.     · You have a severe headache.     · You have severe trouble breathing.    Watch closely for changes in your health, and be sure to contact your doctor if:    · You think you might be pregnant.     · You think you may be depressed.     · You think you may have been exposed to or have a sexually transmitted infection. Where can you learn more? Go to [email or web address] and sign in to your [email or web address] account. Enter R873 in the [facility] box to learn more about \"Combination Birth Control Pills: Care Instructions. \"     If you do not have an account, please click on the \"Sign Up Now\" link. Current as of: February 11, 2020               Content Version: 12.6  © 6293-2380 [employer]. Care instructions adapted under license by [facility] ([facility]). If you have questions about a medical condition or this instruction, always ask your healthcare professional. [address] any warranty or liability for your use of this information.

## 2020-12-01 NOTE — PROGRESS NOTES
Visit Information    Have you changed or started any medications since your last visit including any over-the-counter medicines, vitamins, or herbal medicines? no   Have you stopped taking any of your medications? Is so, why? -  no  Are you having any side effects from any of your medications? - no    Have you seen any other physician or provider since your last visit?  no   Have you had any other diagnostic tests since your last visit?  no   Have you been seen in the emergency room and/or had an admission in a hospital since we last saw you?  no   Have you had your routine dental cleaning in the past 6 months?  no     Do you have an active MyChart account? If no, what is the barrier?   Yes    Patient Care Team:  Doreen Cesar MD as PCP - General (Family Medicine)  Daniel Santos MD as Consulting Physician (Gastroenterology)    Medical History Review  Past Medical, Family, and Social History reviewed and does not contribute to the patient presenting condition    Health Maintenance   Topic Date Due    Hepatitis A vaccine (1 of 2 - Risk 2-dose series) 09/21/1997    Varicella vaccine (1 of 2 - 2-dose childhood series) 09/21/1997    Pneumococcal 0-64 years Vaccine (1 of 1 - PPSV23) 09/21/2002    HPV vaccine (1 - 2-dose series) 09/21/2007    Hepatitis B vaccine (1 of 3 - Risk 3-dose series) 09/21/2015    DTaP/Tdap/Td vaccine (1 - Tdap) 09/21/2015    Cervical cancer screen  09/21/2017    Flu vaccine (1) 09/01/2020    Chlamydia screen  10/23/2021    HIV screen  Completed    Hib vaccine  Aged Out    Meningococcal (ACWY) vaccine  Aged Out

## 2020-12-01 NOTE — PROGRESS NOTES
Attending Physician Statement  I have discussed the care of Velvet Ibrahim 26 y.o. female, including pertinent history and exam findings, with the resident Dr. Misael Ceballos MD.    History and Exam:   Chief Complaint   Patient presents with    Discuss Medications    Health Maintenance     pt refuse     Past Medical History:   Diagnosis Date    Anxiety     Depression     Endocarditis     Hepatitis C     Overdose      No Known Allergies   I have seen and examined the patient and the key elements of the encounter have been performed by me. BP Readings from Last 3 Encounters:   12/01/20 108/76   11/16/20 100/62   10/26/20 112/81     /76 (Site: Right Upper Arm, Position: Sitting, Cuff Size: Medium Adult)   Pulse 104   Temp 97.3 °F (36.3 °C) (Temporal)   Ht 5' 4\" (1.626 m)   Wt 145 lb (65.8 kg)   LMP 11/01/2020 (Approximate)   BMI 24.89 kg/m²   Lab Results   Component Value Date    WBC 9.6 09/28/2020    HGB 13.5 09/28/2020    HCT 41.5 09/28/2020     09/28/2020     (H) 09/28/2020    AST 72 (H) 09/28/2020     09/18/2020    K 4.5 09/18/2020     09/18/2020    CREATININE 0.54 09/18/2020    BUN 13 09/18/2020    CO2 23 09/18/2020    TSH 1.13 09/28/2020    INR 0.9 09/28/2020     Lab Results   Component Value Date    LABALBU 4.5 09/28/2020     Lab Results   Component Value Date    FERRITIN 760 (H) 02/05/2017     No results found for: LDLCALC, LDLCHOLESTEROL, LDLDIRECT  I agree with the assessment, plan and the diagnosis of    Diagnosis Orders   1. Acne, unspecified acne type  Norgestim-Eth Estrad Triphasic (ORTHO TRI-CYCLEN, 28,) 0.18/0.215/0.25 MG-35 MCG TABS   2. Encounter for female birth control  Norgestim-Eth Estrad Triphasic (ORTHO TRI-CYCLEN, 28,) 0.18/0.215/0.25 MG-35 MCG TABS   3. Tobacco use      . I agree with orders as documented by the resident. More than 25 minutes spent  in face to face encounter with the patient and more than half in counseling.  Patient's questions were answered. Patient Voiced understanding to the counseling. Return in about 3 months (around 3/1/2021).    (GC Modifier)-Dr. Daniella Rodarte MD

## 2020-12-03 ENCOUNTER — OFFICE VISIT (OUTPATIENT)
Dept: PRIMARY CARE CLINIC | Age: 25
End: 2020-12-03
Payer: MEDICARE

## 2020-12-03 VITALS
OXYGEN SATURATION: 97 % | DIASTOLIC BLOOD PRESSURE: 73 MMHG | HEART RATE: 98 BPM | TEMPERATURE: 98.5 F | BODY MASS INDEX: 24.37 KG/M2 | SYSTOLIC BLOOD PRESSURE: 108 MMHG | WEIGHT: 142 LBS

## 2020-12-03 PROBLEM — I26.90 SEPTIC PULMONARY EMBOLISM WITHOUT ACUTE COR PULMONALE (HCC): Status: ACTIVE | Noted: 2017-03-01

## 2020-12-03 PROBLEM — I36.1 NON-RHEUMATIC TRICUSPID VALVE INSUFFICIENCY: Status: ACTIVE | Noted: 2018-08-17

## 2020-12-03 PROBLEM — F41.9 ANXIETY: Status: ACTIVE | Noted: 2017-01-12

## 2020-12-03 PROBLEM — I38 ENDOCARDITIS: Status: ACTIVE | Noted: 2017-03-14

## 2020-12-03 PROBLEM — T42.6X1A AMBIEN ACCIDENTAL OVERDOSE: Status: ACTIVE | Noted: 2017-08-13

## 2020-12-03 PROBLEM — T40.1X1A ACCIDENTAL OVERDOSE OF HEROIN (HCC): Status: ACTIVE | Noted: 2018-01-12

## 2020-12-03 PROCEDURE — G8484 FLU IMMUNIZE NO ADMIN: HCPCS | Performed by: NURSE PRACTITIONER

## 2020-12-03 PROCEDURE — 99395 PREV VISIT EST AGE 18-39: CPT | Performed by: NURSE PRACTITIONER

## 2020-12-03 RX ORDER — AMOXICILLIN 500 MG/1
500 CAPSULE ORAL 3 TIMES DAILY
COMMUNITY
End: 2021-11-09

## 2020-12-03 SDOH — HEALTH STABILITY: MENTAL HEALTH: HOW OFTEN DO YOU HAVE A DRINK CONTAINING ALCOHOL?: NEVER

## 2020-12-03 ASSESSMENT — ENCOUNTER SYMPTOMS
DIARRHEA: 0
VOICE CHANGE: 0
ABDOMINAL PAIN: 0
CHOKING: 0
SHORTNESS OF BREATH: 0
WHEEZING: 0
EYE DISCHARGE: 0
VOMITING: 0
CHEST TIGHTNESS: 0
TROUBLE SWALLOWING: 0
BACK PAIN: 0
BLOOD IN STOOL: 0

## 2020-12-03 NOTE — PROGRESS NOTES
An  electronic signature was used to authenticate this note. --Nicol Olguin MA on 12/3/2020 at 1:12 PM  Visit Information    Have you changed or started any medications since your last visit including any over-the-counter medicines, vitamins, or herbal medicines? no   Are you having any side effects from any of your medications? -  no  Have you stopped taking any of your medications? Is so, why? -  no    Have you seen any other physician or provider since your last visit? yes  Have you had any other diagnostic tests since your last visit? No  Have you been seen in the emergency room and/or had an admission to a hospital since we last saw you? No  Have you had your routine dental cleaning in the past 6 months? no    Have you activated your [employer] account? If not, what are your barriers?  No     Patient Care Team:  AMBER Clayton - CNP as PCP - General (Family Medicine)  Becky Smith MD as Consulting Physician (Gastroenterology)    Medical History Review  Past Medical, Family, and Social History reviewed and does not contribute to the patient presenting condition    Health Maintenance   Topic Date Due    Hepatitis A vaccine (1 of 2 - Risk 2-dose series) 09/21/1997    Varicella vaccine (1 of 2 - 2-dose childhood series) 09/21/1997    Pneumococcal 0-64 years Vaccine (1 of 1 - PPSV23) 09/21/2002    HPV vaccine (1 - 2-dose series) 09/21/2007    Hepatitis B vaccine (1 of 3 - Risk 3-dose series) 09/21/2015    DTaP/Tdap/Td vaccine (1 - Tdap) 09/21/2015    Cervical cancer screen  09/21/2017    Flu vaccine (1) 09/01/2020    Chlamydia screen  10/23/2021    HIV screen  Completed    Hib vaccine  Aged Out    Meningococcal (ACWY) vaccine  Aged Out

## 2020-12-03 NOTE — PROGRESS NOTES
12/3/2020    Norita Ahumada (:  1995) is a 26 y.o. female, here for a preventive medicine evaluation. Patient Active Problem List   Diagnosis    Chronic hepatitis C without hepatic coma (HCC)    Insomnia    Family history of breast cancer in mother    [facility]stance abuse ([facility]    Anxiety    Ambien accidental overdose    Accidental overdose of heroin ([facility] [facility]    Endocarditis    Septic pulmonary embolism without acute cor pulmonale ([facility] [address] [facility]    Non-rheumatic tricuspid valve insufficiency     Norita Ahumada is a 27-year-old female here to establish care. Asking for routine physical exam today. Pertinent medical history includes history of IV drug abuse with heroin, interim mission for 4 months. Depression, which she feels is resolved. Anxiety currently under treatment through mental health. Hepatitis C and under the care of GI, needs to be 6 months sober before she can obtain treatment. 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 She receives routine dental care, dentist does premedicate with antibiotics before cleanings or treatments. Was in [address] [place] [address], sober living. Now [facility], a [facility] house for about 3 months. Anxiety currently well controlled on Seroquel and Buspar. Mom with hx of breast cancer, diagnosed at age 28. Review of Systems   Constitutional: Negative for appetite change, chills, diaphoresis, fever and unexpected weight change. HENT: Negative for congestion, drooling, hearing loss, trouble swallowing and voice change. Eyes: Negative for discharge and visual disturbance. Respiratory: Negative for choking, chest tightness, shortness of breath and wheezing. Cardiovascular: Negative for chest pain, palpitations and leg swelling. Gastrointestinal: Negative for abdominal pain, blood in stool, diarrhea and vomiting. Endocrine: Negative for polydipsia and polyuria.    Genitourinary: Negative for difficulty urinating, dyspareunia, frequency, hematuria, pelvic pain, vaginal bleeding and vaginal pain. Musculoskeletal: Negative for back pain, myalgias and neck pain. Allergic/Immunologic: Negative for environmental allergies and food allergies. Neurological: Negative for dizziness, tremors, seizures, syncope, numbness and headaches. Psychiatric/Behavioral: Negative for decreased concentration, dysphoric mood, sleep disturbance and suicidal ideas. The patient is not nervous/anxious. Prior to Visit Medications    Medication Sig Taking? Authorizing Provider   amoxicillin (AMOXIL) 500 MG capsule Take 500 mg by mouth 3 times daily Yes Historical Provider, MD   Norgestim-Eth Estrad Triphasic (ORTHO TRI-CYCLEN, 28,) 0.18/0.215/0.25 MG-35 MCG TABS Use 1 tablet daily as directed.  Yes Kai Coulter MD   busPIRone (BUSPAR) 10 MG tablet Take 10 mg by mouth 3 times daily Yes Historical Provider, MD   QUEtiapine (SEROQUEL) 200 MG tablet Take 200 mg by mouth 2 times daily Yes Historical Provider, MD   naproxen (NAPROSYN) 500 MG tablet Take 1 tablet by mouth 2 times daily (with meals)  Patient not taking: Reported on 12/1/2020  Colton Abadlla DO   ibuprofen (IBU) 800 MG tablet Take 1 tablet by mouth every 6 hours as needed for Pain  Haylee Ames MD   acetaminophen (TYLENOL) 500 MG tablet Take 1 tablet by mouth 4 times daily as needed for Pain  Patient not taking: Reported on 9/24/2020  Haylee Ames MD   dicyclomine (BENTYL) 10 MG capsule Take 1 capsule by mouth every 6 hours as needed (cramps)  Patient not taking: Reported on 12/1/2020  Thomas Denver, MD   melatonin 3 MG TABS tablet Take 3 mg by mouth daily  Historical Provider, MD        No Known Allergies    Past Medical History:   Diagnosis Date    Anxiety     Depression     Endocarditis     Hepatitis C     Overdose        Past Surgical History:   Procedure Laterality Date    ADENOIDECTOMY         Social History     Socioeconomic History  Marital status: Single     Spouse name: Not on file    Number of children: Not on file    Years of education: Not on file    Highest education level: Not on file   Occupational History    Not on file   Social Needs    Financial resource strain: Not on file    Food insecurity     Worry: Not on file     Inability: Not on file    Transportation needs     Medical: Not on file     Non-medical: Not on file   Tobacco Use    Smoking status: Current Every Day Smoker     Packs/day: 0.25     Years: 8.00     Pack years: 2.00     Types: Cigarettes    Smokeless tobacco: Never Used   Substance and Sexual Activity    Alcohol use: Not Currently     Frequency: Never    Drug use: Not Currently     Comment: currently in recovery. Last use 07/24/2020.     Sexual activity: Not on file   Lifestyle    Physical activity     Days per week: Not on file     Minutes per session: Not on file    Stress: Not on file   Relationships    Social connections     Talks on phone: Not on file     Gets together: Not on file     Attends Anabaptism service: Not on file     Active member of club or organization: Not on file     Attends meetings of clubs or organizations: Not on file     Relationship status: Not on file    Intimate partner violence     Fear of current or ex partner: Not on file     Emotionally abused: Not on file     Physically abused: Not on file     Forced sexual activity: Not on file   Other Topics Concern    Not on file   Social History Narrative    Not on file        Family History   Problem Relation Age of Onset    Breast Cancer Mother 28    COPD Father     Asthma Brother     No Known Problems Maternal Grandmother     No Known Problems Maternal Grandfather     No Known Problems Paternal Grandmother     No Known Problems Paternal Grandfather     Colon Cancer Neg Hx     Ovarian Cancer Neg Hx        ADVANCE DIRECTIVE: N, <no information>    Vitals:    12/03/20 1315   BP: 108/73   Pulse: 98   Temp: 98.5 °F (36.9 °C)   SpO2: 97%   Weight: 142 lb (64.4 kg)     Estimated body mass index is 24.37 kg/m² as calculated from the following:    Height as of 12/1/20: 5' 4\" (1.626 m). Weight as of this encounter: 142 lb (64.4 kg). Physical Exam  Vitals signs and nursing note reviewed. Constitutional:       General: She is not in acute distress. Appearance: She is well-developed. HENT:      Head: Normocephalic. Right Ear: Tympanic membrane and external ear normal. There is no impacted cerumen. Left Ear: Tympanic membrane and external ear normal. There is no impacted cerumen. Nose: Nose normal.      Mouth/Throat:      Mouth: Mucous membranes are moist.      Dentition: Abnormal dentition. Gingival swelling present. No dental caries. Eyes:      General: No scleral icterus. Pupils: Pupils are equal, round, and reactive to light. Neck:      Musculoskeletal: Normal range of motion and neck supple. Cardiovascular:      Rate and Rhythm: Normal rate and regular rhythm. Pulmonary:      Effort: Pulmonary effort is normal.      Breath sounds: Normal breath sounds. Abdominal:      Palpations: Abdomen is soft. Lymphadenopathy:      Cervical: No cervical adenopathy. Skin:     General: Skin is warm and dry. Neurological:      Mental Status: She is alert and oriented to person, place, and time. Cranial Nerves: No cranial nerve deficit or facial asymmetry. Coordination: Coordination normal.      Gait: Gait is intact. Psychiatric:         Mood and Affect: Mood and affect normal.         Behavior: Behavior normal. Behavior is cooperative. Thought Content: Thought content normal.         Judgment: Judgment normal.         No flowsheet data found. Lab Results   Component Value Date    GLUCOSE 81 09/18/2020       The ASCVD Risk score (Rupert Rojas et al., 2013) failed to calculate for the following reasons:     The 2013 ASCVD risk score is only valid for ages 36 to 78      There is no immunization history on file for this patient. Health Maintenance   Topic Date Due    Cervical cancer screen  2017    Varicella vaccine (1 of 2 - 2-dose childhood series) 2020 (Originally 1997)    DTaP/Tdap/Td vaccine (1 - Tdap) 2020 (Originally 2015)    Hepatitis A vaccine (1 of 2 - Risk 2-dose series) 2021 (Originally 1997)    Hepatitis B vaccine (1 of 3 - Risk 3-dose series) 2021 (Originally 2015)    HPV vaccine (1 - 2-dose series) 2021 (Originally 2007)    Flu vaccine (1) 2021 (Originally 2020)    Pneumococcal 0-64 years Vaccine (1 of 1 - PPSV23) 2021 (Originally 2002)    Chlamydia screen  10/23/2021    HIV screen  Completed    Hib vaccine  Aged Out    Meningococcal (ACWY) vaccine  Aged Out       ASSESSMENT/PLAN:  1. Well woman exam (no gynecological exam)  Patient with recent cervical cancer screen in September. Encouraged routine physical activity, roughly 150 minutes a week. Can easily be walking. Encouraged proper hydration, roughly half her body weight in ounces of water a day. 2. Vaccine counseling  Patient refusing all vaccinations today. Likely is due for Tdap as well as influenza and pneumococcal if she is a smoker. Educated on risks    3. At high risk for breast cancer  Mother  from breast cancer, first diagnosed at age 28. Patient educated that screening should be started at age 23 for her. Return in about 9 months (around 9/3/2021) for check up. An electronic signature was used to authenticate this note.     --Mortimer Singleton, AMBER - CNP on 12/3/2020 at 1:58 PM

## 2020-12-14 LAB — CYTOLOGY REPORT: NORMAL

## 2021-01-04 ENCOUNTER — NURSE ONLY (OUTPATIENT)
Dept: OBGYN | Age: 26
End: 2021-01-04
Payer: MEDICARE

## 2021-01-04 VITALS
DIASTOLIC BLOOD PRESSURE: 73 MMHG | SYSTOLIC BLOOD PRESSURE: 136 MMHG | HEART RATE: 117 BPM | BODY MASS INDEX: 27.12 KG/M2 | WEIGHT: 158 LBS

## 2021-01-04 DIAGNOSIS — N91.2 AMENORRHEA: Primary | ICD-10-CM

## 2021-01-04 LAB
CONTROL: PRESENT
PREGNANCY TEST URINE, POC: POSITIVE

## 2021-01-04 PROCEDURE — 81025 URINE PREGNANCY TEST: CPT | Performed by: OBSTETRICS & GYNECOLOGY

## 2021-01-04 NOTE — LETTER
[address] [facility]   PHONE: [phone]  FAX: [phone]          1/4/2021      RE: Beltran Roland [id number]    Beltran Roland 1995 is a pregnant patient who requires dental care. Please use an abdominal shield for any X-Ray studies. Please avoid non-steroidal anti-inflammatory agents, such as ibuprofen or naproxen. Antibiotics are safe other than Tetracycline. Acetaminophen and narcotics are safe to administer. Local anesthetics ( e.g lidocaine ) are permissible also. If you have any additional questions, feel free to contact us at [phone]  and leave a message using the patient's medical record number listed beneath hher name. Thank you for caring for our patient.          Sincerely,           Davide Wang MD    OB/GYN DEPARTMENT

## 2021-01-26 ENCOUNTER — HOSPITAL ENCOUNTER (OUTPATIENT)
Dept: NON INVASIVE DIAGNOSTICS | Age: 26
Discharge: HOME | End: 2021-01-26
Payer: MEDICARE

## 2021-01-26 LAB
LV EF: 50 %
LVEF MODALITY: NORMAL

## 2021-01-26 PROCEDURE — 93306 TTE W/DOPPLER COMPLETE: CPT

## 2021-01-29 ENCOUNTER — TELEPHONE (OUTPATIENT)
Dept: OBGYN | Age: 26
End: 2021-01-29

## 2021-01-29 DIAGNOSIS — Z34.90 PREGNANCY, UNSPECIFIED GESTATIONAL AGE: Primary | ICD-10-CM

## 2021-02-04 ENCOUNTER — OFFICE VISIT (OUTPATIENT)
Dept: PRIMARY CARE CLINIC | Age: 26
End: 2021-02-04
Payer: MEDICARE

## 2021-02-04 VITALS
WEIGHT: 166 LBS | SYSTOLIC BLOOD PRESSURE: 132 MMHG | HEART RATE: 112 BPM | DIASTOLIC BLOOD PRESSURE: 86 MMHG | BODY MASS INDEX: 28.49 KG/M2 | OXYGEN SATURATION: 97 % | TEMPERATURE: 99.1 F

## 2021-02-04 DIAGNOSIS — Z87.59 MISCARRIAGE WITHIN LAST 12 MONTHS: ICD-10-CM

## 2021-02-04 DIAGNOSIS — B18.2 CHRONIC HEPATITIS C WITHOUT HEPATIC COMA (HCC): Primary | ICD-10-CM

## 2021-02-04 PROCEDURE — G8484 FLU IMMUNIZE NO ADMIN: HCPCS | Performed by: NURSE PRACTITIONER

## 2021-02-04 PROCEDURE — 4004F PT TOBACCO SCREEN RCVD TLK: CPT | Performed by: NURSE PRACTITIONER

## 2021-02-04 PROCEDURE — G8427 DOCREV CUR MEDS BY ELIG CLIN: HCPCS | Performed by: NURSE PRACTITIONER

## 2021-02-04 PROCEDURE — 99213 OFFICE O/P EST LOW 20 MIN: CPT | Performed by: NURSE PRACTITIONER

## 2021-02-04 PROCEDURE — G8419 CALC BMI OUT NRM PARAM NOF/U: HCPCS | Performed by: NURSE PRACTITIONER

## 2021-02-04 ASSESSMENT — PATIENT HEALTH QUESTIONNAIRE - PHQ9
SUM OF ALL RESPONSES TO PHQ9 QUESTIONS 1 & 2: 0
2. FEELING DOWN, DEPRESSED OR HOPELESS: 0
1. LITTLE INTEREST OR PLEASURE IN DOING THINGS: 0
SUM OF ALL RESPONSES TO PHQ QUESTIONS 1-9: 0
SUM OF ALL RESPONSES TO PHQ QUESTIONS 1-9: 0

## 2021-02-04 ASSESSMENT — ENCOUNTER SYMPTOMS
ABDOMINAL PAIN: 0
VOICE CHANGE: 0
EYE DISCHARGE: 0
NAUSEA: 0
DIARRHEA: 0
CHOKING: 0
BLOOD IN STOOL: 0
ABDOMINAL DISTENTION: 0
SHORTNESS OF BREATH: 0
BACK PAIN: 0
TROUBLE SWALLOWING: 0
WHEEZING: 0
VOMITING: 0
CHEST TIGHTNESS: 0

## 2021-02-04 NOTE — PROGRESS NOTES
[facility]  [id number] [facility] [address]  Dept: [phone]  Dept Fax: [phone]    Patient Care Team:  AMBER Luz CNP as PCP - General (Family Medicine)  AMBER Luz CNP as PCP - [facility] Empaneled Provider  Guillermo Allison MD as Consulting Physician (Gastroenterology)    2021     Amina Barr (:  1995)is a 26 y.o. female, here for evaluation of the following medical concerns:   Chief Complaint   Patient presents with    Referral - General       In ER 3 weeks ago and had a miscarriage  Was going to get blood drawn to watch HCG levels, were going down  Bleeding stopped about 2 days after she made her appointment. Does not know her GYN. Asking for information to see GI for treatment to Hepatitis C  Has been clean since  and ready for treatment. .    Review of Systems   Constitutional: Negative for appetite change, chills, diaphoresis, fever and unexpected weight change. HENT: Negative for congestion, drooling, hearing loss, trouble swallowing and voice change. Eyes: Negative for discharge and visual disturbance. Respiratory: Negative for choking, chest tightness, shortness of breath and wheezing. Cardiovascular: Negative for chest pain, palpitations and leg swelling. Gastrointestinal: Negative for abdominal distention, abdominal pain, blood in stool, diarrhea, nausea and vomiting. Endocrine: Negative for polydipsia and polyuria. Genitourinary: Negative for difficulty urinating, dyspareunia, frequency, hematuria, pelvic pain, vaginal bleeding and vaginal pain. Musculoskeletal: Negative for back pain, myalgias and neck pain. Skin: Negative for rash. Allergic/Immunologic: Negative for environmental allergies and food allergies. Neurological: Negative for dizziness, tremors, seizures, syncope, numbness and headaches.    Psychiatric/Behavioral: Negative for decreased concentration, dysphoric mood, sleep disturbance and suicidal ideas. The patient is not nervous/anxious. Prior to Visit Medications    Medication Sig Taking? Authorizing Provider   QUEtiapine (SEROQUEL) 200 MG tablet Take 200 mg by mouth 2 times daily Yes Historical Provider, MD   amoxicillin (AMOXIL) 500 MG capsule Take 500 mg by mouth 3 times daily  Historical Provider, MD   Norgestim-Eth Estrad Triphasic (ORTHO TRI-CYCLEN, 28,) 0.18/0.215/0.25 MG-35 MCG TABS Use 1 tablet daily as directed. Patient not taking: Reported on 2/4/2021  Shahana Seten MD        Social History     Tobacco Use    Smoking status: Current Every Day Smoker     Packs/day: 0.25     Years: 8.00     Pack years: 2.00     Types: Cigarettes    Smokeless tobacco: Never Used   Substance Use Topics    Alcohol use: Not Currently     Frequency: Never        Vitals:    02/04/21 1144   BP: 132/86   Pulse: 112   Temp: 99.1 °F (37.3 °C)   SpO2: 97%   Weight: 166 lb (75.3 kg)     Estimated body mass index is 28.49 kg/m² as calculated from the following:    Height as of 12/1/20: 5' 4\" (1.626 m). Weight as of this encounter: 166 lb (75.3 kg). DIAGNOSTIC FINDINGS:  CBC:  Lab Results   Component Value Date    WBC 9.6 09/28/2020    HGB 13.5 09/28/2020     09/28/2020       BMP:    Lab Results   Component Value Date     09/18/2020    K 4.5 09/18/2020     09/18/2020    CO2 23 09/18/2020    BUN 13 09/18/2020    CREATININE 0.54 09/18/2020    GLUCOSE 81 09/18/2020       HEMOGLOBIN A1C: No results found for: LABA1C    FASTING LIPID PANEL:No results found for: CHOL, HDL, TRIG    Physical Exam  Vitals signs and nursing note reviewed. Constitutional:       General: She is not in acute distress. Appearance: She is well-developed. HENT:      Head: Normocephalic. Right Ear: Tympanic membrane and external ear normal. There is no impacted cerumen.       Left Ear: Tympanic membrane and external ear normal. There is no impacted cerumen. Nose: Nose normal.      Mouth/Throat:      Mouth: Mucous membranes are moist.      Dentition: Abnormal dentition. Gingival swelling present. No dental caries. Eyes:      General: No scleral icterus. Pupils: Pupils are equal, round, and reactive to light. Neck:      Musculoskeletal: Normal range of motion and neck supple. Cardiovascular:      Rate and Rhythm: Normal rate and regular rhythm. Pulmonary:      Effort: Pulmonary effort is normal.      Breath sounds: Normal breath sounds. Abdominal:      General: There is no distension. Palpations: Abdomen is soft. Lymphadenopathy:      Cervical: No cervical adenopathy. Skin:     General: Skin is warm and dry. Neurological:      Mental Status: She is alert and oriented to person, place, and time. Cranial Nerves: No cranial nerve deficit or facial asymmetry. Coordination: Coordination normal.      Gait: Gait is intact. Psychiatric:         Mood and Affect: Mood and affect normal.         Behavior: Behavior normal. Behavior is cooperative. Thought Content: Thought content normal.         Judgment: Judgment normal.         ASSESSMENT     Diagnosis Orders   1. Chronic hepatitis C without hepatic coma ([facility] [address]     2. Miscarriage within last 12 months            PLAN:  No orders of the defined types were placed in this encounter. 1. Patient states she is no longer bleeding post miscarriage. Not established with a gynecologist.  She does have oral contraceptive pills, however she is holding off on starting them as she is currently in a [employer] house with only females and is not sexually active. 2. Patient was previously evaluated and seen by GI in October. Provided with phone number to call and schedule follow-up regarding hepatitis C treatment. No current symptoms today    FOLLOW UP AND INSTRUCTIONS:  No follow-ups on file.     · [facility] received counseling on the following healthy behaviors:nutrition and exercise    · Discussed use, benefit, and side effects of prescribed medications. Barriers to  medication compliance addressed. All patient questions answered. Pt  verbalized understanding of all instructions given. · Patient given educational materials - see patient instructions      · Patient advised to contact scheduling offices for any referrals or imaging orders  placed today if they have not been contacted in 48 hours. No follow-ups on file. An electronic signature was used to authenticate this note. --AMBER Hernandez CNP on 2/4/2021 at 1:07 PM  Visit Information    Have you changed or started any medications since your last visit including any over-the-counter medicines, vitamins, or herbal medicines? no   Are you having any side effects from any of your medications? -  no  Have you stopped taking any of your medications? Is so, why? -  no    Have you seen any other physician or provider since your last visit? Yes - Records Obtained  Have you had any other diagnostic tests since your last visit? Yes - Records Obtained  Have you been seen in the emergency room and/or had an admission to a hospital since we last saw you? Yes - Records Obtained  Have you had your routine dental cleaning in the past 6 months? no    Have you activated your [employer] account? If not, what are your barriers?  No     Patient Care Team:  AMBER Hernandez CNP as PCP - General (Family Medicine)  AMBER Hernandez CNP as PCP - [facility]  Mallory Hoang MD as Consulting Physician (Gastroenterology)    Medical History Review  Past Medical, Family, and Social History reviewed and does not contribute to the patient presenting condition    Health Maintenance   Topic Date Due    Varicella vaccine (1 of 2 - 2-dose childhood series) 09/21/1997    Hepatitis A vaccine (1 of 2 - Risk 2-dose series) 12/03/2021 (Originally 9/21/1997)    Hepatitis B vaccine (1 of 3 - Risk 3-dose series) 12/03/2021 (Originally 9/21/2015)    HPV vaccine (1 - 2-dose series) 12/03/2021 (Originally 9/21/2007)    Flu vaccine (1) 12/03/2021 (Originally 9/1/2020)    Pneumococcal 0-64 years Vaccine (1 of 1 - PPSV23) 12/03/2021 (Originally 9/21/2002)    DTaP/Tdap/Td vaccine (1 - Tdap) 02/04/2022 (Originally 9/21/2015)    Chlamydia screen  10/23/2021    Cervical cancer screen  11/16/2023    HIV screen  Completed    Hib vaccine  Aged Out    Meningococcal (ACWY) vaccine  Aged Out

## 2021-03-10 ENCOUNTER — OFFICE VISIT (OUTPATIENT)
Dept: PRIMARY CARE CLINIC | Age: 26
End: 2021-03-10
Payer: MEDICARE

## 2021-03-10 VITALS
HEART RATE: 92 BPM | BODY MASS INDEX: 30.9 KG/M2 | DIASTOLIC BLOOD PRESSURE: 72 MMHG | WEIGHT: 180 LBS | OXYGEN SATURATION: 98 % | TEMPERATURE: 98.6 F | SYSTOLIC BLOOD PRESSURE: 110 MMHG

## 2021-03-10 DIAGNOSIS — K21.9 GASTROESOPHAGEAL REFLUX DISEASE WITHOUT ESOPHAGITIS: Primary | ICD-10-CM

## 2021-03-10 DIAGNOSIS — E66.9 OBESITY, CLASS I, BMI 30-34.9: ICD-10-CM

## 2021-03-10 DIAGNOSIS — B18.2 CHRONIC HEPATITIS C WITHOUT HEPATIC COMA (HCC): ICD-10-CM

## 2021-03-10 PROCEDURE — 99213 OFFICE O/P EST LOW 20 MIN: CPT | Performed by: NURSE PRACTITIONER

## 2021-03-10 PROCEDURE — 4004F PT TOBACCO SCREEN RCVD TLK: CPT | Performed by: NURSE PRACTITIONER

## 2021-03-10 PROCEDURE — G8417 CALC BMI ABV UP PARAM F/U: HCPCS | Performed by: NURSE PRACTITIONER

## 2021-03-10 PROCEDURE — G8484 FLU IMMUNIZE NO ADMIN: HCPCS | Performed by: NURSE PRACTITIONER

## 2021-03-10 PROCEDURE — G8427 DOCREV CUR MEDS BY ELIG CLIN: HCPCS | Performed by: NURSE PRACTITIONER

## 2021-03-10 RX ORDER — FAMOTIDINE 20 MG/1
20 TABLET, FILM COATED ORAL 2 TIMES DAILY
Qty: 60 TABLET | Refills: 2 | Status: SHIPPED | OUTPATIENT
Start: 2021-03-10 | End: 2021-12-30

## 2021-03-10 RX ORDER — MIRTAZAPINE 15 MG/1
TABLET, FILM COATED ORAL
COMMUNITY
Start: 2021-02-24 | End: 2021-11-09

## 2021-03-10 RX ORDER — MULTIVITAMIN
TABLET ORAL
COMMUNITY
Start: 2021-02-22

## 2021-03-10 SDOH — ECONOMIC STABILITY: INCOME INSECURITY: HOW HARD IS IT FOR YOU TO PAY FOR THE VERY BASICS LIKE FOOD, HOUSING, MEDICAL CARE, AND HEATING?: NOT ASKED

## 2021-03-10 ASSESSMENT — ENCOUNTER SYMPTOMS
GLOBUS SENSATION: 0
HOARSE VOICE: 0
VOMITING: 1
CHOKING: 1
DIARRHEA: 0
ABDOMINAL PAIN: 0
WHEEZING: 0
TROUBLE SWALLOWING: 0
CONSTIPATION: 0
HEARTBURN: 0
NAUSEA: 1
SORE THROAT: 0
SHORTNESS OF BREATH: 0
COUGH: 0
BLOOD IN STOOL: 0

## 2021-03-10 NOTE — PATIENT INSTRUCTIONS
Patient Education        Gastroesophageal Reflux Disease (GERD): Care Instructions  Overview     Gastroesophageal reflux disease (GERD) is the backward flow of stomach acid into the esophagus. The esophagus is the tube that leads from your throat to your stomach. A one-way valve prevents the stomach acid from backing up into this tube. But when you have GERD, this valve does not close tightly enough. This can also cause pain and swelling in your esophagus. (This is called esophagitis.)  If you have mild GERD symptoms including heartburn, you may be able to control the problem with antacids or over-the-counter medicine. You can also make lifestyle changes to help reduce your symptoms. These include changing your diet and eating habits, such as not eating late at night and losing weight. Follow-up care is a key part of your treatment and safety. Be sure to make and go to all appointments, and call your doctor if you are having problems. It's also a good idea to know your test results and keep a list of the medicines you take. How can you care for yourself at home? · Take your medicines exactly as prescribed. Call your doctor if you think you are having a problem with your medicine. · Your doctor may recommend over-the-counter medicine. For mild or occasional indigestion, antacids, such as Tums, Gaviscon, Mylanta, or Maalox, may help. Your doctor also may recommend over-the-counter acid reducers, such as famotidine (Pepcid AC), cimetidine (Tagamet HB), or omeprazole (Prilosec). Read and follow all instructions on the label. If you use these medicines often, talk with your doctor. · Change your eating habits. ? It's best to eat several small meals instead of two or three large meals. ? After you eat, wait 2 to 3 hours before you lie down. ? Chocolate, mint, and alcohol can make GERD worse. ?  Spicy foods, foods that have a lot of acid (like tomatoes and oranges), and coffee can make GERD symptoms worse in some

## 2021-03-10 NOTE — PROGRESS NOTES
[facility]  [id number] [facility] [address]  Dept: [phone]  Dept Fax: [phone]    Patient Care Team:  AMBER Ramos CNP as PCP - General (Family Medicine)  AMBER Ramos CNP as PCP - [facility] Empaneled Provider  Alvaro Calderón MD as Consulting Physician (Gastroenterology)    3/10/2021     Venice Fitzgerald (:  1995)is a 26 y.o. female, here for evaluation of the following medical concerns:   Chief Complaint   Patient presents with    Gastroesophageal Reflux     Need medication        Gastroesophageal Reflux  She complains of choking and nausea. She reports no abdominal pain, no chest pain, no coughing, no dysphagia, no early satiety, no globus sensation, no heartburn, no hoarse voice, no sore throat or no wheezing. vomiting after meals. This is a new problem. The current episode started 1 to 4 weeks ago. The symptoms are aggravated by caffeine. Risk factors include caffeine use and smoking/tobacco exposure. She has tried an antacid for the symptoms. The treatment provided mild relief. Past procedures do not include an EGD. Sonia Lakhani Review of Systems   Constitutional: Negative for appetite change and fever. HENT: Negative for hoarse voice, sore throat and trouble swallowing. Respiratory: Positive for choking. Negative for cough, shortness of breath and wheezing. Cardiovascular: Negative for chest pain. Gastrointestinal: Positive for nausea and vomiting (in mouth). Negative for abdominal pain, blood in stool, constipation, diarrhea, dysphagia and heartburn. Prior to Visit Medications    Medication Sig Taking?  Authorizing Provider   mirtazapine (REMERON) 15 MG tablet  Yes Historical Provider, MD   Multiple Vitamin (MULTIVITAMIN) TABS  Yes Historical Provider, MD   famotidine (PEPCID) 20 MG tablet Take 1 tablet by mouth 2 times daily Yes AMBER Ramos CNP   QUEtiapine (SEROQUEL) 200 MG tablet Take 300 mg by mouth 2 times daily  Yes Historical Provider, MD   amoxicillin (AMOXIL) 500 MG capsule Take 500 mg by mouth 3 times daily  Historical Provider, MD   Norgestim-Eth Estrad Triphasic (ORTHO TRI-CYCLEN, 28,) 0.18/0.215/0.25 MG-35 MCG TABS Use 1 tablet daily as directed. Patient not taking: Reported on 2/4/2021  Shahana Steen MD        Social History     Tobacco Use    Smoking status: Current Every Day Smoker     Packs/day: 0.25     Years: 8.00     Pack years: 2.00     Types: Cigarettes    Smokeless tobacco: Never Used   Substance Use Topics    Alcohol use: Not Currently     Frequency: Never        Vitals:    03/10/21 1025   BP: 110/72   Site: Left Upper Arm   Position: Sitting   Cuff Size: Medium Adult   Pulse: 92   Temp: 98.6 °F (37 °C)   SpO2: 98%   Weight: 180 lb (81.6 kg)     Estimated body mass index is 30.9 kg/m² as calculated from the following:    Height as of 12/1/20: 5' 4\" (1.626 m). Weight as of this encounter: 180 lb (81.6 kg). DIAGNOSTIC FINDINGS:  CBC:  Lab Results   Component Value Date    WBC 9.6 09/28/2020    HGB 13.5 09/28/2020     09/28/2020       BMP:    Lab Results   Component Value Date     09/18/2020    K 4.5 09/18/2020     09/18/2020    CO2 23 09/18/2020    BUN 13 09/18/2020    CREATININE 0.54 09/18/2020    GLUCOSE 81 09/18/2020       HEMOGLOBIN A1C: No results found for: LABA1C    FASTING LIPID PANEL:No results found for: CHOL, HDL, TRIG    Physical Exam  Vitals signs and nursing note reviewed. Constitutional:       General: She is not in acute distress. Appearance: She is well-developed. HENT:      Head: Normocephalic. Right Ear: Tympanic membrane and external ear normal. There is no impacted cerumen. Left Ear: Tympanic membrane and external ear normal. There is no impacted cerumen. Nose: Nose normal.      Mouth/Throat:      Mouth: Mucous membranes are moist.      Dentition: Abnormal dentition.  Gingival swelling present. No dental caries. Eyes:      General: No scleral icterus. Pupils: Pupils are equal, round, and reactive to light. Neck:      Musculoskeletal: Normal range of motion and neck supple. Cardiovascular:      Rate and Rhythm: Normal rate and regular rhythm. Pulmonary:      Effort: Pulmonary effort is normal.      Breath sounds: Normal breath sounds. Abdominal:      General: There is no distension. Palpations: Abdomen is soft. Tenderness: There is no abdominal tenderness. Lymphadenopathy:      Cervical: No cervical adenopathy. Skin:     General: Skin is warm and dry. Neurological:      Mental Status: She is alert and oriented to person, place, and time. Cranial Nerves: No cranial nerve deficit or facial asymmetry. Coordination: Coordination normal.      Gait: Gait is intact. Psychiatric:         Mood and Affect: Mood and affect normal.         Behavior: Behavior normal. Behavior is cooperative. Thought Content: Thought content normal.         Judgment: Judgment normal.         ASSESSMENT     Diagnosis Orders   1. Gastroesophageal reflux disease without esophagitis  famotidine (PEPCID) 20 MG tablet   2. Chronic hepatitis C without hepatic coma [facility])  External Referral To Gastroenterology   3. Obesity, Class I, BMI 30-34.9            PLAN:  Orders Placed This Encounter   Medications    famotidine (PEPCID) 20 MG tablet     Sig: Take 1 tablet by mouth 2 times daily     Dispense:  60 tablet     Refill:  2         1. 15 pound weight gain since last visit, advised this is likely attributing to her recent symptoms of acid reflux. Patient admits there is lack of options for physical activity in her sober living community, a lot outside twice a week and no available exercise equipment. Heavily reviewed and home exercise options that can be done without equipment. 2. Discussed dietary triggers for acid reflux, limiting spicy and greasy foods.   Patient admits to minimal caffeine intake. Has also heavily decreased nicotine use as they cannot smoke in the home she is in  3. Asking for referral to Dr. Estela Butler in [address] [place] [address] for hepatitis C as she was told they can get her in sooner than our GI office. External referral placed. 4. Famotidine started twice daily for acid reflux symptoms. FOLLOW UP AND INSTRUCTIONS:  Return in about 6 months (around 9/10/2021) for reflux. · Doreen Vyas received counseling on the following healthy behaviors:nutrition and exercise    · Discussed use, benefit, and side effects of prescribed medications. Barriers to  medication compliance addressed. All patient questions answered. Pt  verbalized understanding of all instructions given. · Patient given educational materials - see patient instructions      · Patient advised to contact scheduling offices for any referrals or imaging orders  placed today if they have not been contacted in 48 hours. Return in about 6 months (around 9/10/2021) for reflux. An electronic signature was used to authenticate this note. --AMBER Walter CNP on 3/10/2021 at 11:28 AM    Visit Information    Have you changed or started any medications since your last visit including any over-the-counter medicines, vitamins, or herbal medicines? Yes  Are you having any side effects from any of your medications? -  no  Have you stopped taking any of your medications? Is so, why? -  no    Have you seen any other physician or provider since your last visit? No  Have you had any other diagnostic tests since your last visit? No  Have you been seen in the emergency room and/or had an admission to a hospital since we last saw you? No  Have you had your routine dental cleaning in the past 6 months? yes -     Have you activated your [employer] account? If not, what are your barriers?  No: Declined      Patient Care Team:  AMBER Walter CNP as PCP - General (Family Medicine)  AMBER Walter CNP as PCP - [facility] Empaneled Provider  Michelle Avalos MD as Consulting Physician (Gastroenterology)    Medical History Review  Past Medical, Family, and Social History reviewed and does contribute to the patient presenting condition    Health Maintenance   Topic Date Due    Varicella vaccine (1 of 2 - 2-dose childhood series) Never done    COVID-19 Vaccine (1 of 2) Never done    Hepatitis A vaccine (1 of 2 - Risk 2-dose series) 12/03/2021 (Originally 9/21/1997)    Hepatitis B vaccine (1 of 3 - Risk 3-dose series) 12/03/2021 (Originally 9/21/2015)    HPV vaccine (1 - 2-dose series) 12/03/2021 (Originally 9/21/2007)    Flu vaccine (1) 12/03/2021 (Originally 9/1/2020)    Pneumococcal 0-64 years Vaccine (1 of 1 - PPSV23) 12/03/2021 (Originally 9/21/2002)    DTaP/Tdap/Td vaccine (1 - Tdap) 02/04/2022 (Originally 9/21/2015)    Chlamydia screen  10/23/2021    Cervical cancer screen  11/16/2023    HIV screen  Completed    Hib vaccine  Aged Out    Meningococcal (ACWY) vaccine  Aged Out

## 2021-04-02 ENCOUNTER — HOSPITAL ENCOUNTER (OUTPATIENT)
Age: 26
Discharge: HOME | End: 2021-04-02
Payer: MEDICARE

## 2021-04-02 DIAGNOSIS — B18.2 CHRONIC HEPATITIS C WITHOUT HEPATIC COMA (HCC): ICD-10-CM

## 2021-04-02 LAB
ABSOLUTE EOS #: 0.09 K/UL (ref 0–0.44)
ABSOLUTE IMMATURE GRANULOCYTE: <0.03 K/UL (ref 0–0.3)
ABSOLUTE LYMPH #: 1.92 K/UL (ref 1.1–3.7)
ABSOLUTE MONO #: 0.72 K/UL (ref 0.1–1.2)
AFP: 2.3 UG/L
ALBUMIN SERPL-MCNC: 4.4 G/DL (ref 3.5–5.2)
ALBUMIN/GLOBULIN RATIO: 1.1 (ref 1–2.5)
ALP BLD-CCNC: 55 U/L (ref 35–104)
ALT SERPL-CCNC: 109 U/L (ref 5–33)
AMPHETAMINE SCREEN URINE: NEGATIVE
ANION GAP SERPL CALCULATED.3IONS-SCNC: 12 MMOL/L (ref 9–17)
AST SERPL-CCNC: 70 U/L
BARBITURATE SCREEN URINE: NEGATIVE
BASOPHILS # BLD: 1 % (ref 0–2)
BASOPHILS ABSOLUTE: 0.05 K/UL (ref 0–0.2)
BENZODIAZEPINE SCREEN, URINE: NEGATIVE
BILIRUB SERPL-MCNC: 0.39 MG/DL (ref 0.3–1.2)
BILIRUBIN DIRECT: 0.11 MG/DL
BILIRUBIN, INDIRECT: 0.28 MG/DL (ref 0–1)
BUN BLDV-MCNC: 14 MG/DL (ref 6–20)
BUPRENORPHINE URINE: NORMAL
CALCIUM SERPL-MCNC: 9.3 MG/DL (ref 8.6–10.4)
CANNABINOID SCREEN URINE: NEGATIVE
CHLORIDE BLD-SCNC: 100 MMOL/L (ref 98–107)
CO2: 24 MMOL/L (ref 20–31)
COCAINE METABOLITE, URINE: NEGATIVE
CREAT SERPL-MCNC: 0.77 MG/DL (ref 0.5–0.9)
DIFFERENTIAL TYPE: ABNORMAL
EOSINOPHILS RELATIVE PERCENT: 2 % (ref 1–4)
ETHANOL PERCENT: <0.01 %
ETHANOL: <10 MG/DL
GFR AFRICAN AMERICAN: >60 ML/MIN
GFR NON-AFRICAN AMERICAN: >60 ML/MIN
GFR SERPL CREATININE-BSD FRML MDRD: ABNORMAL ML/MIN/{1.73_M2}
GFR SERPL CREATININE-BSD FRML MDRD: ABNORMAL ML/MIN/{1.73_M2}
GLUCOSE BLD-MCNC: 76 MG/DL (ref 70–99)
HBV SURFACE AB TITR SER: 3.97 MIU/ML
HCT VFR BLD CALC: 38.7 % (ref 36.3–47.1)
HEMOGLOBIN: 12.7 G/DL (ref 11.9–15.1)
HEPATITIS B CORE TOTAL ANTIBODY: NONREACTIVE
IMMATURE GRANULOCYTES: 0 %
LYMPHOCYTES # BLD: 35 % (ref 24–43)
MCH RBC QN AUTO: 31.4 PG (ref 25.2–33.5)
MCHC RBC AUTO-ENTMCNC: 32.8 G/DL (ref 28.4–34.8)
MCV RBC AUTO: 95.8 FL (ref 82.6–102.9)
MDMA URINE: NORMAL
METHADONE SCREEN, URINE: NEGATIVE
METHAMPHETAMINE, URINE: NORMAL
MONOCYTES # BLD: 13 % (ref 3–12)
NRBC AUTOMATED: 0 PER 100 WBC
OPIATES, URINE: NEGATIVE
OXYCODONE SCREEN URINE: NEGATIVE
PDW BLD-RTO: 13.2 % (ref 11.8–14.4)
PHENCYCLIDINE, URINE: NEGATIVE
PLATELET # BLD: 360 K/UL (ref 138–453)
PLATELET ESTIMATE: ABNORMAL
PMV BLD AUTO: 10.7 FL (ref 8.1–13.5)
POTASSIUM SERPL-SCNC: 4.4 MMOL/L (ref 3.7–5.3)
PROPOXYPHENE, URINE: NORMAL
RBC # BLD: 4.04 M/UL (ref 3.95–5.11)
RBC # BLD: ABNORMAL 10*6/UL
SEG NEUTROPHILS: 49 % (ref 36–65)
SEGMENTED NEUTROPHILS ABSOLUTE COUNT: 2.72 K/UL (ref 1.5–8.1)
SODIUM BLD-SCNC: 136 MMOL/L (ref 135–144)
TEST INFORMATION: NORMAL
TOTAL PROTEIN: 8.4 G/DL (ref 6.4–8.3)
TRICYCLIC ANTIDEPRESSANTS, UR: NORMAL
WBC # BLD: 5.5 K/UL (ref 3.5–11.3)
WBC # BLD: ABNORMAL 10*3/UL

## 2021-04-02 PROCEDURE — 82105 ALPHA-FETOPROTEIN SERUM: CPT

## 2021-04-02 PROCEDURE — 80053 COMPREHEN METABOLIC PANEL: CPT

## 2021-04-02 PROCEDURE — 87389 HIV-1 AG W/HIV-1&-2 AB AG IA: CPT

## 2021-04-02 PROCEDURE — 86708 HEPATITIS A ANTIBODY: CPT

## 2021-04-02 PROCEDURE — 82977 ASSAY OF GGT: CPT

## 2021-04-02 PROCEDURE — 36415 COLL VENOUS BLD VENIPUNCTURE: CPT

## 2021-04-02 PROCEDURE — 84520 ASSAY OF UREA NITROGEN: CPT

## 2021-04-02 PROCEDURE — 80307 DRUG TEST PRSMV CHEM ANLYZR: CPT

## 2021-04-02 PROCEDURE — 82248 BILIRUBIN DIRECT: CPT

## 2021-04-02 PROCEDURE — G0480 DRUG TEST DEF 1-7 CLASSES: HCPCS

## 2021-04-02 PROCEDURE — 86317 IMMUNOASSAY INFECTIOUS AGENT: CPT

## 2021-04-02 PROCEDURE — 85025 COMPLETE CBC W/AUTO DIFF WBC: CPT

## 2021-04-02 PROCEDURE — 86704 HEP B CORE ANTIBODY TOTAL: CPT

## 2021-04-02 PROCEDURE — 84460 ALANINE AMINO (ALT) (SGPT): CPT

## 2021-04-02 PROCEDURE — 83883 ASSAY NEPHELOMETRY NOT SPEC: CPT

## 2021-04-02 PROCEDURE — 84450 TRANSFERASE (AST) (SGOT): CPT

## 2021-04-03 LAB
HAV AB SERPL IA-ACNC: REACTIVE
HIV AG/AB: NONREACTIVE

## 2021-04-05 ENCOUNTER — OFFICE VISIT (OUTPATIENT)
Dept: GASTROENTEROLOGY | Age: 26
End: 2021-04-05
Payer: MEDICARE

## 2021-04-05 VITALS — WEIGHT: 178 LBS | SYSTOLIC BLOOD PRESSURE: 126 MMHG | DIASTOLIC BLOOD PRESSURE: 87 MMHG | BODY MASS INDEX: 30.55 KG/M2

## 2021-04-05 DIAGNOSIS — B18.2 CHRONIC HEPATITIS C WITHOUT HEPATIC COMA (HCC): Primary | ICD-10-CM

## 2021-04-05 LAB
ALANINE AMINOTRANSFERASE, FIBROMETER: 129 U/L (ref 5–40)
ALPHA-2-MACROGLOBULIN, FIBROMETER: 233 MG/DL (ref 131–293)
ASPARTATE AMINOTRANSFERASE, FIBROMETER: 71 U/L (ref 9–40)
CIRRHOMETER PATIENT SCORE: 0
EER FIBROMETER REPORT: ABNORMAL
FIBROMETER INTERPRETATION: ABNORMAL
FIBROMETER PATIENT SCORE: 0.09
FIBROMETER PLATELET COUNT: 360
FIBROMETER PROTHROMBIN INDEX: 101 % (ref 90–120)
FIBROSIS METAVIR CLASSIFICATION: ABNORMAL
GAMMA GLUTAMYL TRANSFERASE, FIBROMETER: 44 U/L (ref 7–33)
INFLAMETER METAVIR CLASSIFICATION: ABNORMAL
INFLAMETER PATIENT SCORE: 0.39
UREA NITROGEN, FIBROMETER: 14 MG/DL (ref 7–20)

## 2021-04-05 PROCEDURE — G8427 DOCREV CUR MEDS BY ELIG CLIN: HCPCS | Performed by: INTERNAL MEDICINE

## 2021-04-05 PROCEDURE — G8417 CALC BMI ABV UP PARAM F/U: HCPCS | Performed by: INTERNAL MEDICINE

## 2021-04-05 PROCEDURE — 99213 OFFICE O/P EST LOW 20 MIN: CPT | Performed by: INTERNAL MEDICINE

## 2021-04-05 PROCEDURE — 4004F PT TOBACCO SCREEN RCVD TLK: CPT | Performed by: INTERNAL MEDICINE

## 2021-04-05 NOTE — PROGRESS NOTES
GI FOLLOW UP    INTERVAL HISTORY:     Follow-up for hepatitis C treatment  Genotype 3  Noncirrhotic  Abstaining from illicit drugs    Chief Complaint   Patient presents with    Hepatitis C     Patient completed all labs and US Liver. Denies any abdominal pain.  Gastroesophageal Reflux     Patient states she just started taking Famotidine 20 MG BID a month ago and has noticed a dramatic difference. rarely has any reflux. 1. Chronic hepatitis C without hepatic coma (HCC)          HISTORY OF PRESENT ILLNESS: Maureen Lima is a 26 y.o. female with a past history remarkable for endocarditis several years ago, on heart valve, referred for evaluation of chronic HCV.       Smoker: Active smoker, 5 cig/day, x 8 yrs. Drinking history: socially  Illicit drugs: Heroin IV, crystal meth. Sharran Gather in the past.  Last use of IV drugs approximately 3 months ago  Abdominal surgeries: No abdominal surgeries  Prior Colonoscopy: N  Prior EGD:N  FH of GI issues: None       Past Medical,Family, and Social History reviewed and does contribute to the patient presenting condition. Patient's PMH/PSH,SH,PSYCH Hx, MEDs, ALLERGIES, and ROS were all reviewed and updated in the appropriate sections.     PAST MEDICAL HISTORY:  Past Medical History:   Diagnosis Date    Anxiety     Depression     Endocarditis     Hepatitis C     Overdose        Past Surgical History:   Procedure Laterality Date    ADENOIDECTOMY         CURRENT MEDICATIONS:    Current Outpatient Medications:     mirtazapine (REMERON) 15 MG tablet, , Disp: , Rfl:     Multiple Vitamin (MULTIVITAMIN) TABS, , Disp: , Rfl:     famotidine (PEPCID) 20 MG tablet, Take 1 tablet by mouth 2 times daily, Disp: 60 tablet, Rfl: 2    amoxicillin (AMOXIL) 500 MG capsule, Take 500 mg by mouth 3 times daily, Disp: , Rfl:     Norgestim-Eth Estrad Triphasic (ORTHO TRI-CYCLEN, 28,) 0.18/0.215/0.25 MG-35 MCG TABS, Use 1 tablet daily as directed., Disp: 28 tablet, Rfl: 3    QUEtiapine (SEROQUEL) 200 MG tablet, Take 300 mg by mouth 2 times daily , Disp: , Rfl:     ALLERGIES:   No Known Allergies    FAMILY HISTORY:       Problem Relation Age of Onset    Breast Cancer Mother 28    COPD Father     Asthma Brother     No Known Problems Maternal Grandmother     No Known Problems Maternal Grandfather     No Known Problems Paternal Grandmother     No Known Problems Paternal Grandfather     Colon Cancer Neg Hx     Ovarian Cancer Neg Hx          SOCIAL HISTORY:   Social History     Socioeconomic History    Marital status: Single     Spouse name: Not on file    Number of children: Not on file    Years of education: Not on file    Highest education level: Not on file   Occupational History    Not on file   Social Needs    Financial resource strain: Not on file    Food insecurity     Worry: Never true     Inability: Never true    Transportation needs     Medical: Not on file     Non-medical: Not on file   Tobacco Use    Smoking status: Current Every Day Smoker     Packs/day: 0.25     Years: 8.00     Pack years: 2.00     Types: Cigarettes    Smokeless tobacco: Never Used   Substance and Sexual Activity    Alcohol use: Not Currently     Frequency: Never    Drug use: Not Currently     Comment: currently in recovery. Last use 07/24/2020.     Sexual activity: Not on file   Lifestyle    Physical activity     Days per week: Not on file     Minutes per session: Not on file    Stress: Not on file   Relationships    Social connections     Talks on phone: Not on file     Gets together: Not on file     Attends Congregation service: Not on file     Active member of club or organization: Not on file     Attends meetings of clubs or organizations: Not on file     Relationship status: Not on file    Intimate partner violence     Fear of current or ex partner: Not on file Emotionally abused: Not on file     Physically abused: Not on file     Forced sexual activity: Not on file   Other Topics Concern    Not on file   Social History Narrative    Not on file       REVIEW OF SYSTEMS: A 12-point review of systems was obtained and pertinent positives and negatives were listed below. REVIEW OF SYSTEMS:     Constitutional: No fever, no chills, no lethargy, no weakness. HEENT:  No headache, otalgia, itchy eyes, nasal discharge or sore throat. Cardiac:  No chest pain, dyspnea, orthopnea or PND. Chest:   No cough, phlegm or wheezing. Abdomen:      Detailed by MA   Neuro:  No focal weakness, abnormal movements or seizure like activity. Skin:   No rashes, no itching. :   No hematuria, no pyuria, no dysuria, no flank pain. Extremities:  No swelling or joint pains. ROS was otherwise negative    Review of Systems   Constitutional: Negative. HENT: Negative. Eyes: Negative. Respiratory: Negative. Cardiovascular: Negative. Gastrointestinal: Negative. Endocrine: Negative. Genitourinary: Negative. Musculoskeletal: Negative. Skin: Negative. Allergic/Immunologic: Negative. Neurological: Negative. Hematological: Negative. Psychiatric/Behavioral: Negative. All other systems reviewed and are negative. PHYSICAL EXAMINATION: Vital signs reviewed per the nursing documentation. /87   Wt 178 lb (80.7 kg)   BMI 30.55 kg/m²   Body mass index is 30.55 kg/m². Physical Exam    Physical Exam   Constitutional: Patient is oriented to person, place, and time. Patient appears well-developed and well-nourished. HENT:   Head: Normocephalic and atraumatic. Eyes: Pupils are equal, round, and reactive to light. EOM are normal.   Neck: Normal range of motion. Neck supple. No JVD present. No tracheal deviation present. No thyromegaly present. Cardiovascular: Normal rate, regular rhythm, normal heart sounds and intact distal pulses. Pulmonary/Chest: Effort normal and breath sounds normal. No stridor. No respiratory distress. He has no wheezes. He has no rales. He exhibits no tenderness. Abdominal: Soft. Bowel sounds are normal. He exhibits no distension and no mass. There is no tenderness. There is no rebound and no guarding. No hernia. Musculoskeletal: Normal range of motion. Lymphadenopathy:    Patient has no cervical adenopathy. Neurological: Patient is alert and oriented to person, place, and time. Psychiatric: Patient has a normal mood and affect. Patient behavior is normal.       LABORATORY DATA: Reviewed  Lab Results   Component Value Date    WBC 5.5 04/02/2021    HGB 12.7 04/02/2021    HCT 38.7 04/02/2021    MCV 95.8 04/02/2021     04/02/2021     04/02/2021    K 4.4 04/02/2021     04/02/2021    CO2 24 04/02/2021    BUN 14 04/02/2021    CREATININE 0.77 04/02/2021    LABALBU 4.4 04/02/2021    BILITOT 0.39 04/02/2021    ALKPHOS 55 04/02/2021    AST 70 (H) 04/02/2021     (H) 04/02/2021    INR 0.9 09/28/2020         Lab Results   Component Value Date    RBC 4.04 04/02/2021    HGB 12.7 04/02/2021    MCV 95.8 04/02/2021    MCH 31.4 04/02/2021    MCHC 32.8 04/02/2021    RDW 13.2 04/02/2021    MPV 10.7 04/02/2021    BASOPCT 1 04/02/2021    LYMPHSABS 1.92 04/02/2021    MONOSABS 0.72 04/02/2021    NEUTROABS 2.72 04/02/2021    EOSABS 0.09 04/02/2021    BASOSABS 0.05 04/02/2021         DIAGNOSTIC TESTING:     No results found. Assessment  1. Chronic hepatitis C without hepatic coma (HCC)          IMPRESSION: Andrea Mcfarlane is a 26 y.o. female with a past history remarkable for endocarditis several years ago, valvular disease, referred for evaluation of chronic HCV treatment. Genotype 3, noncirrhotic. Abstaining from illicit drugs and alcohol for several months, approximately 6 or more      PLAN:    1) chronic hepatitis C start approval process. Consider M[place] x8 weeks.   Discussed risks and benefits of medication. Informed patient that she needs to maintain compliancy. She has abstained from illicit drugs for 6 months now and clinically doing well from a GI standpoint. Risk of transmission discussed with the patient carefully. 2) RTC in 2 months. Thank you for allowing me to participate in the care of Ms. Boyd. For any further questions please do not hesitate to contact me. I have reviewed and agree with the ROS entered by the MA/LPN from today's encounter documented in a separate note. Jose Enrique Lee MD, MPH   [facility] Gastroenterology  Office #: [phone]    this note is created with the assistance of a speech recognition program.  While intending to generate a document that actually reflects the content of the visit, the document can still have some errors including those of syntax and sound a like substitutions which may escape proof reading. It such instances, actual meaning can be extrapolated by contextual diversion.

## 2021-04-09 ENCOUNTER — TELEPHONE (OUTPATIENT)
Dept: GASTROENTEROLOGY | Age: 26
End: 2021-04-09

## 2021-04-09 NOTE — TELEPHONE ENCOUNTER
LVM for pt that appt on 06/11/21 has been changed to an OV and moved to [address] [place] [address] V's and to call us if this was not going to work so we could reschedule for her

## 2021-04-27 ENCOUNTER — NURSE TRIAGE (OUTPATIENT)
Dept: OTHER | Facility: CLINIC | Age: 26
End: 2021-04-27

## 2021-04-27 ENCOUNTER — TELEPHONE (OUTPATIENT)
Dept: PRIMARY CARE CLINIC | Age: 26
End: 2021-04-27

## 2021-04-27 NOTE — TELEPHONE ENCOUNTER
Pt stated that she was working out last week running on the [employer] and that her ankles swelled up and the next day were hurting. Since then the swelling has gone down but come back and pain has increased.  Conferenced with Anne/NT

## 2021-04-27 NOTE — TELEPHONE ENCOUNTER
chance you are pregnant? \" \"When was your last menstrual period? \"      No, one week ago    Protocols used: ANKLE PAIN-ADULT-[place]

## 2021-05-03 ENCOUNTER — OFFICE VISIT (OUTPATIENT)
Dept: PRIMARY CARE CLINIC | Age: 26
End: 2021-05-03
Payer: MEDICARE

## 2021-05-03 VITALS
OXYGEN SATURATION: 100 % | SYSTOLIC BLOOD PRESSURE: 111 MMHG | DIASTOLIC BLOOD PRESSURE: 74 MMHG | WEIGHT: 180.8 LBS | TEMPERATURE: 97.5 F | HEART RATE: 82 BPM | BODY MASS INDEX: 31.03 KG/M2

## 2021-05-03 DIAGNOSIS — R60.0 BILATERAL LEG EDEMA: Primary | ICD-10-CM

## 2021-05-03 PROCEDURE — G8427 DOCREV CUR MEDS BY ELIG CLIN: HCPCS | Performed by: NURSE PRACTITIONER

## 2021-05-03 PROCEDURE — 4004F PT TOBACCO SCREEN RCVD TLK: CPT | Performed by: NURSE PRACTITIONER

## 2021-05-03 PROCEDURE — G8417 CALC BMI ABV UP PARAM F/U: HCPCS | Performed by: NURSE PRACTITIONER

## 2021-05-03 PROCEDURE — 99213 OFFICE O/P EST LOW 20 MIN: CPT | Performed by: NURSE PRACTITIONER

## 2021-05-03 RX ORDER — FUROSEMIDE 20 MG/1
20 TABLET ORAL DAILY
Qty: 5 TABLET | Refills: 0 | Status: SHIPPED | OUTPATIENT
Start: 2021-05-03 | End: 2021-11-09

## 2021-05-03 ASSESSMENT — ENCOUNTER SYMPTOMS
EYE DISCHARGE: 0
VOMITING: 0
BACK PAIN: 0
WHEEZING: 0
VOICE CHANGE: 0
NAUSEA: 0
SHORTNESS OF BREATH: 0
DIARRHEA: 0
ABDOMINAL PAIN: 0
TROUBLE SWALLOWING: 0
CHOKING: 0
ABDOMINAL DISTENTION: 0
BLOOD IN STOOL: 0
CHEST TIGHTNESS: 0

## 2021-05-03 NOTE — PATIENT INSTRUCTIONS
Try not to sit with your legs crossed at the knee. · Keep your legs raised above your heart when you're lying down. This reduces swelling. If these treatments don't work, you may need medicine or a procedure to help relieve symptoms. Procedures might be done to close the vein, to remove the vein, or to improve blood flow. Follow-up care is a key part of your treatment and safety. Be sure to make and go to all appointments, and call your doctor if you are having problems. It's also a good idea to know your test results and keep a list of the medicines you take. Current as of: March 4, 2020               Content Version: 12.8  © 5601-3300 [employer], [employer]. Care instructions adapted under license by [facility] ([facility]). If you have questions about a medical condition or this instruction, always ask your healthcare professional. [address] any warranty or liability for your use of this information.

## 2021-05-03 NOTE — PROGRESS NOTES
[facility]  [id number] [facility] [address]  Dept: [phone]  Dept Fax: [phone]    Patient Care Team:  AMBER Pagan CNP as PCP - General (Family Medicine)  AMBER Pagan CNP as PCP - [facility] Empaneled Provider  Tita Hines MD as Consulting Physician (Gastroenterology)    5/3/2021     Gabriella Bamberger (:  1995)is a 26 y.o. female, here for evaluation of the following medical concerns:   Chief Complaint   Patient presents with    Joint Swelling     Bilateral, onset 2 week ago       Gabriella Bamberger presents today for evaluation of bilateral ankle swelling. She states it started roughly 2 weeks ago, day after she had been exercising with a Wii fit device. The patient states this is a new activity, that she is been exercising with the device 3 to 4 days a week for at least 2 weeks before the swelling started. No color change  No other new activities  Walking improves the pain, more painful whens he first wakes up  No CP or SOB. Pain is starting to shoot up the leg  She finds her right leg is more painful, however she does have an ankle monitor attached to that ankle. She is a current smoker. No history of blood clots. .    Review of Systems   Constitutional: Negative for appetite change, chills, diaphoresis, fever and unexpected weight change. HENT: Negative for congestion, drooling, hearing loss, trouble swallowing and voice change. Eyes: Negative for discharge and visual disturbance. Respiratory: Negative for choking, chest tightness, shortness of breath and wheezing. Cardiovascular: Positive for leg swelling. Negative for chest pain and palpitations. Gastrointestinal: Negative for abdominal distention, abdominal pain, blood in stool, diarrhea, nausea and vomiting. Endocrine: Negative for polydipsia and polyuria.    Genitourinary: Negative for difficulty urinating, dyspareunia, frequency, hematuria, pelvic pain, vaginal bleeding and vaginal pain. Musculoskeletal: Negative for back pain, myalgias and neck pain. Skin: Negative for rash. Allergic/Immunologic: Negative for environmental allergies and food allergies. Neurological: Negative for dizziness, tremors, seizures, syncope, numbness and headaches. Psychiatric/Behavioral: Negative for decreased concentration, dysphoric mood, sleep disturbance and suicidal ideas. The patient is not nervous/anxious. Prior to Visit Medications    Medication Sig Taking? Authorizing Provider   furosemide (LASIX) 20 MG tablet Take 1 tablet by mouth daily for 5 days Yes AMBER Uriarte CNP   Multiple Vitamin (MULTIVITAMIN) TABS  Yes Historical Provider, MD   famotidine (PEPCID) 20 MG tablet Take 1 tablet by mouth 2 times daily Yes AMBER Uriarte CNP   QUEtiapine (SEROQUEL) 200 MG tablet Take 300 mg by mouth 2 times daily  Yes Historical Provider, MD   mirtazapine (REMERON) 15 MG tablet   Historical Provider, MD   amoxicillin (AMOXIL) 500 MG capsule Take 500 mg by mouth 3 times daily  Historical Provider, MD   Norgestim-Eth Estrad Triphasic (ORTHO TRI-CYCLEN, 28,) 0.18/0.215/0.25 MG-35 MCG TABS Use 1 tablet daily as directed. Patient not taking: Reported on 5/3/2021  Thomas Walker MD        Social History     Tobacco Use    Smoking status: Current Every Day Smoker     Packs/day: 0.25     Years: 8.00     Pack years: 2.00     Types: Cigarettes    Smokeless tobacco: Never Used   Substance Use Topics    Alcohol use: Not Currently     Frequency: Never        Vitals:    05/03/21 1640   BP: 111/74   Site: Right Upper Arm   Position: Sitting   Cuff Size: Medium Adult   Pulse: 82   Temp: 97.5 °F (36.4 °C)   TempSrc: Temporal   SpO2: 100%   Weight: 180 lb 12.8 oz (82 kg)     Estimated body mass index is 31.03 kg/m² as calculated from the following:    Height as of 12/1/20: 5' 4\" (1.626 m).     Weight as of this encounter: 180 lb 12.8 oz (82 kg). DIAGNOSTIC FINDINGS:  CBC:  Lab Results   Component Value Date    WBC 5.5 2021    HGB 12.7 2021     2021       BMP:    Lab Results   Component Value Date     2021    K 4.4 2021     2021    CO2 24 2021    BUN 14 2021    CREATININE 0.77 2021    GLUCOSE 76 2021       HEMOGLOBIN A1C: No results found for: LABA1C    FASTING LIPID PANEL:No results found for: CHOL, HDL, TRIG    Physical Exam  Vitals signs and nursing note reviewed. Constitutional:       General: She is not in acute distress. Appearance: She is well-developed. HENT:      Head: Normocephalic. Right Ear: Tympanic membrane and external ear normal. There is no impacted cerumen. Left Ear: Tympanic membrane and external ear normal. There is no impacted cerumen. Nose: Nose normal.      Mouth/Throat:      Mouth: Mucous membranes are moist.      Dentition: Abnormal dentition. Gingival swelling present. No dental caries. Eyes:      General: No scleral icterus. Pupils: Pupils are equal, round, and reactive to light. Neck:      Musculoskeletal: Normal range of motion and neck supple. Cardiovascular:      Rate and Rhythm: Normal rate and regular rhythm. Pulmonary:      Effort: Pulmonary effort is normal.      Breath sounds: Normal breath sounds. Abdominal:      General: There is no distension. Palpations: Abdomen is soft. Tenderness: There is no abdominal tenderness. Musculoskeletal:      Right lower le+ Pitting Edema present. Left lower le+ Pitting Edema present. Lymphadenopathy:      Cervical: No cervical adenopathy. Skin:     General: Skin is warm and dry. Neurological:      Mental Status: She is alert and oriented to person, place, and time. Cranial Nerves: No cranial nerve deficit or facial asymmetry. Coordination: Coordination normal.      Gait: Gait is intact.    Psychiatric: Have you seen any other physician or provider since your last visit? No  Have you had any other diagnostic tests since your last visit? No  Have you been seen in the emergency room and/or had an admission to a hospital since we last saw you? No  Have you had your routine dental cleaning in the past 6 months? no    Have you activated your [employer] account? If not, what are your barriers?  No: pt declined     Patient Care Team:  AMBER Cortes CNP as PCP - General (Family Medicine)  AMBER Cortes CNP as PCP - [facility]  Loretta Grigsby MD as Consulting Physician (Gastroenterology)    Medical History Review  Past Medical, Family, and Social History reviewed and does not contribute to the patient presenting condition    Health Maintenance   Topic Date Due    COVID-19 Vaccine (1) Never done    Flu vaccine (Season Ended) 12/03/2021 (Originally 9/1/2021)    Pneumococcal 0-64 years Vaccine (1 of 1 - PPSV23) 12/03/2021 (Originally 9/21/2002)    DTaP/Tdap/Td vaccine (6 - Td) 02/04/2022 (Originally 10/8/2017)    Chlamydia screen  10/23/2021    Cervical cancer screen  11/16/2023    Hepatitis A vaccine  Completed    Hepatitis B vaccine  Completed    HPV vaccine  Completed    Varicella vaccine  Completed    HIV screen  Completed    Hib vaccine  Aged Out    Meningococcal (ACWY) vaccine  Aged Out

## 2021-05-04 ENCOUNTER — TELEPHONE (OUTPATIENT)
Dept: GASTROENTEROLOGY | Age: 26
End: 2021-05-04

## 2021-05-04 DIAGNOSIS — B18.2 CHRONIC HEPATITIS C WITHOUT HEPATIC COMA (HCC): Primary | ICD-10-CM

## 2021-05-04 NOTE — TELEPHONE ENCOUNTER
Writer called patient back. Informed her that PA was sent to [employer] and we are just waiting on a response from her insurance. Assured patient as soon as we receive something we will let her know. Patient thanked Sridhar Pimentel for returning her call and letting her know.

## 2021-05-04 NOTE — TELEPHONE ENCOUNTER
patient hasn't heard anything regarding her Hep C medication would like to talk with nurse.  Please call [phone]

## 2021-05-14 NOTE — TELEPHONE ENCOUNTER
Writer received denial from insurance stating that patients HCV RNA is out of range (180 days). Writer contacted patient and informed her that I was sending in a lab order for her to complete, once this is finished we should be able to obtain the approval.  Patient thanked writer and stated she would do it this weekend.

## 2021-05-17 ENCOUNTER — HOSPITAL ENCOUNTER (OUTPATIENT)
Age: 26
Discharge: HOME | End: 2021-05-17
Payer: MEDICARE

## 2021-05-17 DIAGNOSIS — B18.2 CHRONIC HEPATITIS C WITHOUT HEPATIC COMA (HCC): ICD-10-CM

## 2021-05-17 PROCEDURE — 87522 HEPATITIS C REVRS TRNSCRPJ: CPT

## 2021-05-17 PROCEDURE — 36415 COLL VENOUS BLD VENIPUNCTURE: CPT

## 2021-05-20 LAB
DIRECT EXAM: ABNORMAL
Lab: ABNORMAL
SPECIMEN DESCRIPTION: ABNORMAL

## 2021-06-11 NOTE — TELEPHONE ENCOUNTER
Writer returned call. Patient states she spoke with  who requested she fill out a form allowing them to do a PA for her. Writer informed patient that after the first denial, I sent the updated lab information to them, so we should have everything we need to have this approved. We are just waiting to hear back from insurance. Patient stated understanding. Advised patient to call our office when her delivery is set up so we can schedule her next OV. Patient thanked Pili Wilkes.

## 2021-06-22 ENCOUNTER — TELEPHONE (OUTPATIENT)
Dept: PRIMARY CARE CLINIC | Age: 26
End: 2021-06-22

## 2021-06-22 NOTE — TELEPHONE ENCOUNTER
Patient is requesting a order for lab order for blood work to test for Fentanyl due to probation requirements, would like to receive phone call when order has been placed, also stated that she is currently residing in a \"Recovery [facility]\"    Health Maintenance   Topic Date Due    COVID-19 Vaccine (1) Never done    Flu vaccine (Season Ended) 12/03/2021 (Originally 9/1/2021)    Pneumococcal 0-64 years Vaccine (1 of 2 - PPSV23) 12/03/2021 (Originally 9/21/2002)    DTaP/Tdap/Td vaccine (6 - Td or Tdap) 02/04/2022 (Originally 10/8/2017)    Chlamydia screen  10/23/2021    Cervical cancer screen  11/16/2023    Hepatitis A vaccine  Completed    Hepatitis B vaccine  Completed    HPV vaccine  Completed    Varicella vaccine  Completed    HIV screen  Completed    Hib vaccine  Aged Out    Meningococcal (ACWY) vaccine  Aged Out             (applicable per patient's age: Cancer Screenings, Depression Screening, Fall Risk Screening, Immunizations)    AST (U/L)   Date Value   04/02/2021 70 (H)     ALT (U/L)   Date Value   04/02/2021 109 (H)     BUN (mg/dL)   Date Value   04/02/2021 14      (goal A1C is < 7)   (goal LDL is <100) need 30-50% reduction from baseline     BP Readings from Last 3 Encounters:   05/03/21 111/74   04/05/21 126/87   03/10/21 110/72    (goal /80)      All Future Testing planned in [facility]:  Lab Frequency Next Occurrence   US LIVER Once 09/24/2021   PAP Smear Once 11/16/2021   US OB TRANSVAGINAL Once 01/29/2022       Next Visit Date:  Future Appointments   Date Time Provider Haylee Zaidi   9/3/2021  8:15 AM AMBER Dutton - CNP [facility]            Patient Active Problem List:     Chronic hepatitis C without hepatic coma ([address]     Insomnia     Family history of breast cancer in mother     Substance abuse ([facility]     Anxiety     Ambien accidental overdose     Accidental overdose of heroin ([facility]     Endocarditis     Septic pulmonary embolism without acute [facility]monale [facility])     Non-rheumatic tricuspid valve insufficiency

## 2021-06-22 NOTE — TELEPHONE ENCOUNTER
Her  would have appropriate testing sites locations for her to be tested.   I am unclear as to why her  is referring her to her primary care for drug testing

## 2021-06-24 NOTE — TELEPHONE ENCOUNTER
Patient called states  is not requesting drug panel ,patient is requesting for herself . please advise

## 2021-07-15 ENCOUNTER — TELEPHONE (OUTPATIENT)
Dept: PRIMARY CARE CLINIC | Age: 26
End: 2021-07-15

## 2021-07-15 DIAGNOSIS — Z30.019 ENCOUNTER FOR FEMALE BIRTH CONTROL: ICD-10-CM

## 2021-07-15 DIAGNOSIS — L70.9 ACNE, UNSPECIFIED ACNE TYPE: ICD-10-CM

## 2021-07-15 RX ORDER — NORGESTIMATE AND ETHINYL ESTRADIOL 7DAYSX3 28
KIT ORAL
Qty: 28 TABLET | Refills: 3 | Status: SHIPPED | OUTPATIENT
Start: 2021-07-15 | End: 2021-11-01 | Stop reason: SDUPTHER

## 2021-08-02 ENCOUNTER — TELEPHONE (OUTPATIENT)
Dept: GASTROENTEROLOGY | Age: 26
End: 2021-08-02

## 2021-08-03 NOTE — TELEPHONE ENCOUNTER
Writer returned call (Line beeped as if it was disconnected). If patient calls back- they make take medication as needed*, but should take it separately from her [address].

## 2021-08-05 ENCOUNTER — OFFICE VISIT (OUTPATIENT)
Dept: OBGYN CLINIC | Age: 26
End: 2021-08-05
Payer: MEDICARE

## 2021-08-05 VITALS — SYSTOLIC BLOOD PRESSURE: 120 MMHG | DIASTOLIC BLOOD PRESSURE: 78 MMHG | HEIGHT: 64 IN | BODY MASS INDEX: 31.03 KG/M2

## 2021-08-05 DIAGNOSIS — N91.2 AMENORRHEA: ICD-10-CM

## 2021-08-05 DIAGNOSIS — Z30.09 ENCOUNTER FOR OTHER GENERAL COUNSELING OR ADVICE ON CONTRACEPTION: Primary | ICD-10-CM

## 2021-08-05 LAB
CONTROL: NORMAL
PREGNANCY TEST URINE, POC: NEGATIVE

## 2021-08-05 PROCEDURE — 81025 URINE PREGNANCY TEST: CPT | Performed by: OBSTETRICS & GYNECOLOGY

## 2021-08-05 PROCEDURE — 99202 OFFICE O/P NEW SF 15 MIN: CPT | Performed by: OBSTETRICS & GYNECOLOGY

## 2021-08-05 PROCEDURE — 4004F PT TOBACCO SCREEN RCVD TLK: CPT | Performed by: OBSTETRICS & GYNECOLOGY

## 2021-08-05 PROCEDURE — G8427 DOCREV CUR MEDS BY ELIG CLIN: HCPCS | Performed by: OBSTETRICS & GYNECOLOGY

## 2021-08-05 PROCEDURE — G8417 CALC BMI ABV UP PARAM F/U: HCPCS | Performed by: OBSTETRICS & GYNECOLOGY

## 2021-08-05 RX ORDER — TRANDOLAPRIL TABLETS 4 MG/1
4 TABLET ORAL DAILY
COMMUNITY

## 2021-08-05 RX ORDER — UBIDECARENONE 75 MG
50 CAPSULE ORAL DAILY
COMMUNITY

## 2021-08-05 NOTE — PROGRESS NOTES
Navya Julian comes to the office today as a new visit patient. She is currently on OCPs given to her by her APRN. She just started them last month and states that she has not had a cycle. She does not use barrier protection. Last sexual contact was 1 week ago. She is known to have chronic hepatitis C most likely from IVDU. She was seen by her gastroenterologist Dr. Tony Morales and did have abnormal LFTs. This was essentially unchanged from previous LFTs done this past September. She is primarily here today to discuss having Nexplanon because she is fearful that she may forget to take her OCPs. ALT 109High   5 - 33 U/L Final 04/02/2021 11:16 AM [facility]   AST 70High   <32 U/L Final 04/02/2021 11:16 AM      ALT 118High   5 - 33 U/L Final 09/28/2020  4:04 PM [facility]   AST 72High   <32 U/L Final 09/28/2020  4:04 PM      UPT in the office today was negative. She was counseled on all forms of birth control. She is aware that hormonal based birth control may increase her risk of developing a blood clot which may increase her morbidity and or mortality. She was counseled on alternate non hormonal based contraception options. We discussed that smoking and any hormonal based contraception may increase the patients risks of developing these life threatening blood clots. She was urged to use barrier contraception for sexually transmitted disease prevention and spread. The patient was also informed of antibiotics decreasing contraceptive efficacy and the need for barrier contraception from the onset of her antibiotic dosing and through a minimum of thirty days from antibiotic cessation. We also discussed at length abnormal liver function and contraindication for hormonally based birth control. Although she is recently started medication for hepatitis C, we did not know how her LFTs are trending since April.   I recommended that she follow-up with Dr. Tony Morales and that when he thinks that she can more safely start hormonal birth control to schedule an appointment in the office. She was given information on Nexplanon. RTO as needed. Aiden Villareal MD, [address], [place].   [address] [place] [address],[address]

## 2021-08-09 ENCOUNTER — TELEPHONE (OUTPATIENT)
Dept: GASTROENTEROLOGY | Age: 26
End: 2021-08-09

## 2021-08-09 NOTE — TELEPHONE ENCOUNTER
Patient called office stating that she is in need of labs. Wanted to have birth control put in but her doctor does not feel comfortable with this since she has Hep C and had elevated liver enzymes. Would like a call back to discuss this.

## 2021-08-09 NOTE — TELEPHONE ENCOUNTER
Patient states she started her [address] 5 weeks ago. She states she wants the birth control stick in her arm instead of what she is currently on. She is advised that the dr who is asking for the labs should order them. She is asking that we get permission form Dr Otf Wu to put this order in for her. Can her birth control be changed while taking Mavyret? She is informed that Dr Otf Wu is out of the office this week and this can be addressed next week. She verbalizes understanding.

## 2021-08-20 ENCOUNTER — TELEPHONE (OUTPATIENT)
Dept: GASTROENTEROLOGY | Age: 26
End: 2021-08-20

## 2021-08-20 NOTE — TELEPHONE ENCOUNTER
[place] from [facility] facility called regarding patient . She lvm wanting to know what Dr Josefina Trna wants to do patient is currently locked up for another 1 1/2 wks . Patient has missed 2 doses of her Mavyret .  Please return Kacey call on Monday after 7:00 am . Thank You

## 2021-08-23 NOTE — TELEPHONE ENCOUNTER
Writer returned call to [facility]. No answer. LVM informing primo that patient should be more than [facility] through he treatment, but it is VERY important that she coordinates someone to bring this medication to that facility as soon as possible so she does not miss any more doses. Told primo to call our office if there is anything else we can help with.

## 2021-10-04 NOTE — TELEPHONE ENCOUNTER
Writer attempted to reach out to patient. Her phone states she is not accepting any calls right now.

## 2021-10-04 NOTE — TELEPHONE ENCOUNTER
-  Have you had a chance to look into birth control while taking hep c medication? Is it okay for this patient to change hers?   Please advise

## 2021-10-29 ENCOUNTER — TELEPHONE (OUTPATIENT)
Dept: PRIMARY CARE CLINIC | Age: 26
End: 2021-10-29

## 2021-10-29 DIAGNOSIS — L70.9 ACNE, UNSPECIFIED ACNE TYPE: ICD-10-CM

## 2021-10-29 DIAGNOSIS — Z30.019 ENCOUNTER FOR FEMALE BIRTH CONTROL: ICD-10-CM

## 2021-11-01 RX ORDER — NORGESTIMATE AND ETHINYL ESTRADIOL 7DAYSX3 28
KIT ORAL
Qty: 28 TABLET | Refills: 3 | Status: SHIPPED | OUTPATIENT
Start: 2021-11-01 | End: 2022-02-16 | Stop reason: SDUPTHER

## 2021-11-09 ENCOUNTER — OFFICE VISIT (OUTPATIENT)
Dept: PRIMARY CARE CLINIC | Age: 26
End: 2021-11-09
Payer: MEDICARE

## 2021-11-09 ENCOUNTER — HOSPITAL ENCOUNTER (OUTPATIENT)
Age: 26
Discharge: HOME | End: 2021-11-09
Payer: MEDICARE

## 2021-11-09 VITALS
SYSTOLIC BLOOD PRESSURE: 103 MMHG | DIASTOLIC BLOOD PRESSURE: 72 MMHG | OXYGEN SATURATION: 98 % | HEART RATE: 103 BPM | BODY MASS INDEX: 29.52 KG/M2 | WEIGHT: 172 LBS | TEMPERATURE: 98.4 F

## 2021-11-09 DIAGNOSIS — B18.2 CHRONIC HEPATITIS C WITHOUT HEPATIC COMA (HCC): Primary | ICD-10-CM

## 2021-11-09 DIAGNOSIS — Z91.89 NEED FOR DENTAL CARE: ICD-10-CM

## 2021-11-09 DIAGNOSIS — Z30.09 BIRTH CONTROL COUNSELING: ICD-10-CM

## 2021-11-09 DIAGNOSIS — B18.2 CHRONIC HEPATITIS C WITHOUT HEPATIC COMA (HCC): ICD-10-CM

## 2021-11-09 PROCEDURE — 4004F PT TOBACCO SCREEN RCVD TLK: CPT | Performed by: NURSE PRACTITIONER

## 2021-11-09 PROCEDURE — G8417 CALC BMI ABV UP PARAM F/U: HCPCS | Performed by: NURSE PRACTITIONER

## 2021-11-09 PROCEDURE — G8427 DOCREV CUR MEDS BY ELIG CLIN: HCPCS | Performed by: NURSE PRACTITIONER

## 2021-11-09 PROCEDURE — 36415 COLL VENOUS BLD VENIPUNCTURE: CPT

## 2021-11-09 PROCEDURE — 87522 HEPATITIS C REVRS TRNSCRPJ: CPT

## 2021-11-09 PROCEDURE — 99213 OFFICE O/P EST LOW 20 MIN: CPT | Performed by: NURSE PRACTITIONER

## 2021-11-09 PROCEDURE — G8484 FLU IMMUNIZE NO ADMIN: HCPCS | Performed by: NURSE PRACTITIONER

## 2021-11-09 RX ORDER — MIRTAZAPINE 45 MG/1
TABLET, FILM COATED ORAL
COMMUNITY
Start: 2021-10-06

## 2021-11-09 RX ORDER — DIVALPROEX SODIUM 250 MG/1
TABLET, DELAYED RELEASE ORAL
COMMUNITY
Start: 2021-10-11

## 2021-11-09 ASSESSMENT — ENCOUNTER SYMPTOMS
ABDOMINAL DISTENTION: 0
BLOOD IN STOOL: 0
VOMITING: 0
TROUBLE SWALLOWING: 0
VOICE CHANGE: 0
WHEEZING: 0
CHOKING: 0
BACK PAIN: 0
CHEST TIGHTNESS: 0
SHORTNESS OF BREATH: 0
ABDOMINAL PAIN: 0
DIARRHEA: 0
EYE DISCHARGE: 0
NAUSEA: 0

## 2021-11-09 NOTE — PROGRESS NOTES
[facility]  [id number] [facility] [address]  Dept: [phone]  Dept Fax: [phone]    Patient Care Team:  AMBER Zavala CNP as PCP - General (Family Medicine)  AMBER Zavala CNP as PCP - [facility] Empaneled Provider  Fili Morales MD as Consulting Physician (Gastroenterology)    2021     Joie Gallardo (:  1995)is a 23 y.o. female, here for evaluation of the following medical concerns:   Chief Complaint   Patient presents with    Check-Up    Discuss Medications     Hep C       Here today for a check up    [facility] states she is still following with mental health provider, did supply list of updated medications. Now taking mirtazapine. Competed all but last week of [employer] for Hep C, was incarcerated and medications could not be released in time for her to take them. She thought she could not finish the medication since she missed roughly a weeks worth by the time she was able to get them. Has f/u with GI in early January    She is asking today if we can recheck her hepatitis C levels. Patient desires to switch to Nexplanon birth control device, but cannot have it implanted if she has to take any other oral hepatitis C medication. So she wishes to have blood work drawn to verify if the infection is no longer active. She is currently taking oral birth control pills. Megan Win is also requesting that I look into her mouth. She states she occasionally experiences a shooting pain in her right upper jaw. No recent dental work or dental visits. She currently is not having any pain, swelling, or trouble swallowing. No fevers or chills. .    Review of Systems   Constitutional: Negative for appetite change, chills, diaphoresis, fever and unexpected weight change. HENT: Positive for dental problem. Negative for congestion, drooling, hearing loss, trouble swallowing and voice change. Eyes: Negative for discharge and visual disturbance. Respiratory: Negative for choking, chest tightness, shortness of breath and wheezing. Cardiovascular: Negative for chest pain, palpitations and leg swelling. Gastrointestinal: Negative for abdominal distention, abdominal pain, blood in stool, diarrhea, nausea and vomiting. Endocrine: Negative for polydipsia and polyuria. Genitourinary: Negative for difficulty urinating, dyspareunia, frequency, hematuria, pelvic pain, vaginal bleeding and vaginal pain. Musculoskeletal: Negative for back pain, myalgias and neck pain. Skin: Negative for rash. Allergic/Immunologic: Negative for environmental allergies and food allergies. Neurological: Negative for dizziness, tremors, seizures, syncope, numbness and headaches. Psychiatric/Behavioral: Negative for decreased concentration, dysphoric mood, sleep disturbance and suicidal ideas. The patient is not nervous/anxious. Prior to Visit Medications    Medication Sig Taking? Authorizing Provider   divalproex (DEPAKOTE) 250 MG DR tablet  Yes Historical Provider, MD   mirtazapine (REMERON) 45 MG tablet  Yes Historical Provider, MD   Norgestim-Eth Estrad Triphasic (ORTHO TRI-CYCLEN, 28,) 0.18/0.215/0.25 MG-35 MCG TABS Use 1 tablet daily as directed.  Yes AMBER Valdez CNP   vitamin B-12 (CYANOCOBALAMIN) 100 MCG tablet Take 50 mcg by mouth daily Yes Historical Provider, MD   trandolapril (MAVIK) 4 MG tablet Take 4 mg by mouth daily  Historical Provider, MD   Multiple Vitamin (MULTIVITAMIN) TABS   Historical Provider, MD   famotidine (PEPCID) 20 MG tablet Take 1 tablet by mouth 2 times daily  AMBER Valdez CNP        Social History     Tobacco Use    Smoking status: Current Every Day Smoker     Packs/day: 0.25     Years: 8.00     Pack years: 2.00     Types: Cigarettes    Smokeless tobacco: Never Used   Substance Use Topics    Alcohol use: Not Currently        Vitals:    11/09/21 1768 BP: 103/72   Site: Right Upper Arm   Position: Sitting   Pulse: 103   Temp: 98.4 °F (36.9 °C)   TempSrc: Temporal   SpO2: 98%   Weight: 172 lb (78 kg)     Estimated body mass index is 29.52 kg/m² as calculated from the following:    Height as of 8/5/21: 5' 4\" (1.626 m). Weight as of this encounter: 172 lb (78 kg). DIAGNOSTIC FINDINGS:  CBC:  Lab Results   Component Value Date    WBC 5.5 04/02/2021    HGB 12.7 04/02/2021     04/02/2021       BMP:    Lab Results   Component Value Date     04/02/2021    K 4.4 04/02/2021     04/02/2021    CO2 24 04/02/2021    BUN 14 04/02/2021    CREATININE 0.77 04/02/2021    GLUCOSE 76 04/02/2021       HEMOGLOBIN A1C: No results found for: LABA1C    FASTING LIPID PANEL:No results found for: CHOL, HDL, TRIG    Physical Exam  Vitals and nursing note reviewed. Constitutional:       General: She is not in acute distress. Appearance: She is well-developed. HENT:      Head: Normocephalic. Right Ear: Tympanic membrane and external ear normal. There is no impacted cerumen. Left Ear: Tympanic membrane and external ear normal. There is no impacted cerumen. Nose: Nose normal.      Mouth/Throat:      Mouth: Mucous membranes are moist.      Dentition: Abnormal dentition. Gingival swelling and dental caries present. No dental tenderness. Eyes:      General: No scleral icterus. Pupils: Pupils are equal, round, and reactive to light. Cardiovascular:      Rate and Rhythm: Normal rate and regular rhythm. Pulmonary:      Effort: Pulmonary effort is normal.      Breath sounds: Normal breath sounds. Abdominal:      General: There is no distension. Palpations: Abdomen is soft. Musculoskeletal:      Cervical back: Normal range of motion and neck supple. Lymphadenopathy:      Cervical: No cervical adenopathy. Skin:     General: Skin is warm and dry. Neurological:      Mental Status: She is alert and oriented to person, place, and time. Cranial Nerves: No cranial nerve deficit or facial asymmetry. Coordination: Coordination normal.      Gait: Gait is intact. Psychiatric:         Mood and Affect: Mood and affect normal.         Behavior: Behavior normal. Behavior is cooperative. Thought Content: Thought content normal.         Judgment: Judgment normal.         ASSESSMENT     Diagnosis Orders   1. Chronic hepatitis C without hepatic coma (HCC)  Hepatitis C RNA, quantitative, PCR   2. Need for dental care     3. Birth control counseling            PLAN:  No orders of the defined types were placed in this encounter. 1. Order placed to evaluate hepatitis C RNA today. Did discuss birth control options with the patient, advised Depo-Provera injection does not appear to have any contraindications or interactions with Gera if she were to require further treatment. Unfortunately she would have to stop her oral birth control as there is high risk of liver damage when used together. 2. Heavily encourage patient to contact dental provider due to multiple dental caries. Encourage at this time to brush teeth twice daily, utilize floss. No current signs or symptoms of infection. Patient verbalized understanding    FOLLOW UP AND INSTRUCTIONS:  Return in about 1 year (around 11/9/2022). · Isis Ottouber received counseling on the following healthy behaviors:nutrition and medication adherence    · Discussed use, benefit, and side effects of prescribed medications. Barriers to  medication compliance addressed. All patient questions answered. Pt  verbalized understanding of all instructions given. · Patient given educational materials - see patient instructions      · Patient advised to contact scheduling offices for any referrals or imaging orders  placed today if they have not been contacted in 48 hours. Return in about 1 year (around 11/9/2022). An electronic signature was used to authenticate this note.     --Mancel Hamman AMBER West CNP on 11/9/2021 at 10:15 AM  Visit Information    Have you changed or started any medications since your last visit including any over-the-counter medicines, vitamins, or herbal medicines? yes -    Are you having any side effects from any of your medications? -  no  Have you stopped taking any of your medications? Is so, why? -  no    Have you seen any other physician or provider since your last visit? Yes - Records Obtained  Have you had any other diagnostic tests since your last visit? No  Have you been seen in the emergency room and/or had an admission to a hospital since we last saw you? No  Have you had your routine dental cleaning in the past 6 months? no    Have you activated your [employer] account? If not, what are your barriers?  No: declined     Patient Care Team:  AMBER Felix CNP as PCP - General (Family Medicine)  AMBER Felix CNP as PCP - [facility]  Lee Goodpasture, MD as Consulting Physician (Gastroenterology)    Medical History Review  Past Medical, Family, and Social History reviewed and does not contribute to the patient presenting condition    Health Maintenance   Topic Date Due    COVID-19 Vaccine (1) Never done    Creatinine monitoring  09/18/2021    Pneumococcal 0-64 years Vaccine (1 of 2 - PPSV23) 12/03/2021 (Originally 9/21/2002)    DTaP/Tdap/Td vaccine (6 - Td or Tdap) 02/04/2022 (Originally 10/8/2017)    Flu vaccine (1) 11/09/2022 (Originally 9/1/2021)    Potassium monitoring  04/02/2022    Pap smear  11/16/2023    Hepatitis A vaccine  Completed    Hepatitis B vaccine  Completed    HPV vaccine  Completed    Varicella vaccine  Completed    HIV screen  Completed    Hib vaccine  Aged Out    Meningococcal (ACWY) vaccine  Aged Out

## 2021-11-12 LAB
DIRECT EXAM: NORMAL
Lab: NORMAL
SPECIMEN DESCRIPTION: NORMAL

## 2021-12-03 ENCOUNTER — HOSPITAL ENCOUNTER (EMERGENCY)
Age: 26
Discharge: HOME | End: 2021-12-04
Attending: EMERGENCY MEDICINE
Payer: MEDICARE

## 2021-12-03 VITALS
SYSTOLIC BLOOD PRESSURE: 109 MMHG | TEMPERATURE: 97.2 F | RESPIRATION RATE: 16 BRPM | OXYGEN SATURATION: 98 % | WEIGHT: 175 LBS | HEART RATE: 86 BPM | DIASTOLIC BLOOD PRESSURE: 72 MMHG | BODY MASS INDEX: 29.88 KG/M2 | HEIGHT: 64 IN

## 2021-12-03 DIAGNOSIS — S93.402A SPRAIN OF LEFT ANKLE, UNSPECIFIED LIGAMENT, INITIAL ENCOUNTER: Primary | ICD-10-CM

## 2021-12-03 PROCEDURE — 99283 EMERGENCY DEPT VISIT LOW MDM: CPT

## 2021-12-03 ASSESSMENT — PAIN SCALES - GENERAL: PAINLEVEL_OUTOF10: 5

## 2021-12-03 ASSESSMENT — PAIN DESCRIPTION - LOCATION: LOCATION: ANKLE

## 2021-12-03 NOTE — Clinical Note
Candace Cortez was seen and treated in our emergency department on 12/3/2021. She may return to work on 12/06/2021. If you have any questions or concerns, please don't hesitate to call.       Giuliano Nolasco, DO

## 2021-12-04 ENCOUNTER — APPOINTMENT (OUTPATIENT)
Dept: GENERAL RADIOLOGY | Age: 26
End: 2021-12-04
Payer: MEDICARE

## 2021-12-04 PROCEDURE — 73610 X-RAY EXAM OF ANKLE: CPT

## 2021-12-04 PROCEDURE — 6370000000 HC RX 637 (ALT 250 FOR IP): Performed by: STUDENT IN AN ORGANIZED HEALTH CARE EDUCATION/TRAINING PROGRAM

## 2021-12-04 PROCEDURE — 73630 X-RAY EXAM OF FOOT: CPT

## 2021-12-04 RX ORDER — NAPROXEN 500 MG/1
500 TABLET ORAL 2 TIMES DAILY WITH MEALS
Qty: 20 TABLET | Refills: 0 | Status: SHIPPED | OUTPATIENT
Start: 2021-12-04

## 2021-12-04 RX ORDER — NAPROXEN 250 MG/1
500 TABLET ORAL ONCE
Status: COMPLETED | OUTPATIENT
Start: 2021-12-04 | End: 2021-12-04

## 2021-12-04 RX ADMIN — NAPROXEN 500 MG: 250 TABLET ORAL at 00:23

## 2021-12-04 ASSESSMENT — PAIN SCALES - GENERAL: PAINLEVEL_OUTOF10: 8

## 2021-12-04 NOTE — ED PROVIDER NOTES
[facility]     Emergency Department     Faculty Attestation    I performed a history and physical examination of the patient and discussed management with the resident. I reviewed the residents note and agree with the documented findings and plan of care. Any areas of disagreement are noted on the chart. I was personally present for the key portions of any procedures. I have documented in the chart those procedures where I was not present during the key portions. I have reviewed the emergency nurses triage note. I agree with the chief complaint, past medical history, past surgical history, allergies, medications, social and family history as documented unless otherwise noted below. For Physician Assistant/ Nurse Practitioner cases/documentation I have personally evaluated this patient and have completed at least one if not all key elements of the E/M (history, physical exam, and MDM). Additional findings are as noted. I have personally seen and evaluated the patient. I find the patient's history and physical exam are consistent with the NP/PA documentation. I agree with the care provided, treatment rendered, disposition and follow-up plan. Pain in the left ankle without obvious deformity neurovascular intact      Critical Care     Althea Damon M.D.   Attending Emergency  Physician              Daniel Medina MD  12/04/21 0495

## 2021-12-04 NOTE — ED NOTES
This patient was assessed by the doctor only. Nurse processed and completed the orders from this doctor ie labs, meds, and/or EKG.       Jose Brewer LPN  [id number]

## 2021-12-04 NOTE — ED PROVIDER NOTES
[facility] ED  Emergency Department Encounter  EmergencyMedicine Resident     Pt Mariella Hayward  MRN: [id number]  [place] 1995  Date of evaluation: 12/4/21  PCP:  AMBER Zavala CNP    This patient was evaluated in the Emergency Department for symptoms described in the history of present illness. The patient was evaluated in the context of the global COVID-19 pandemic, which necessitated consideration that the patient might be at risk for infection with the SARS-CoV-2 virus that causes COVID-19. Institutional protocols and algorithms that pertain to the evaluation of patients at risk for COVID-19 are in a state of rapid change based on information released by regulatory bodies including the CDC and federal and state organizations. These policies and algorithms were followed during the patient's care in the ED. CHIEF COMPLAINT       Chief Complaint   Patient presents with    Ankle Pain     slipped down two steps    Joint Swelling       HISTORY OF PRESENT ILLNESS  (Location/Symptom, Timing/Onset, Context/Setting, Quality, Duration, Modifying Factors, Severity.)      Joie Gallardo is a 23 y.o. female who presents with into the left ankle after slipping on the steps earlier tonight approximately 5 hours ago. No head injury no loss of consciousness no other injuries. Patient has been ambulatory on the extremity with some discomfort some swelling. No prior injuries to this area. Applied some ice to the area with some improvement prior to arrival.    PAST MEDICAL / SURGICAL / SOCIAL / FAMILY HISTORY      has a past medical history of Anxiety, Depression, Endocarditis, Hepatitis C, and Overdose.       has a past surgical history that includes Adenoidectomy.       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 Last use 07/24/2020.  Sexual activity: Not on file   Other Topics Concern    Not on file   Social History Narrative    Not on file     Social Determinants of Health     Financial Resource Strain:     Difficulty of Paying Living Expenses: Not on file   Food Insecurity: No Food Insecurity    Worried About Running Out of Food in the Last Year: Never true    Ran Out of Food in the Last Year: Never true   Transportation Needs:     Lack of Transportation (Medical): Not on file    Lack of Transportation (Non-Medical):  Not on file   Physical Activity:     Days of Exercise per Week: Not on file    Minutes of Exercise per Session: Not on file   Stress:     Feeling of Stress : Not on file   Social Connections:     Frequency of Communication with Friends and Family: Not on file    Frequency of Social Gatherings with Friends and Family: Not on file    Attends Taoism Services: Not on file    Active Member of [address] [place] [address] [employer] or Organizations: Not on file    Attends Club or Organization Meetings: Not on file    Marital Status: Not on file   Intimate Partner Violence:     Fear of Current or Ex-Partner: Not on file    Emotionally Abused: Not on file    Physically Abused: Not on file    Sexually Abused: Not on file   Housing Stability:     Unable to Pay for Housing in the Last Year: Not on file    Number of [place] in the Last Year: Not on file    Unstable Housing in the Last Year: Not on file       Family History   Problem Relation Age of Onset    Breast Cancer Mother 28    COPD Father     Asthma Brother     No Known Problems Maternal Grandmother     No Known Problems Maternal Grandfather     No Known Problems Paternal Grandmother     No Known Problems Paternal Grandfather     Colon Cancer Neg Hx     Ovarian Cancer Neg Hx        Allergies:  Patient has no known allergies. Home Medications:  Prior to Admission medications    Medication Sig Start Date End Date Taking? Authorizing Provider   divalproex (DEPAKOTE) 250 MG DR tablet  10/11/21   Historical Provider, MD   mirtazapine (REMERON) 45 MG tablet  10/6/21   Historical Provider, MD   Norgestim-Eth Estrad Triphasic (ORTHO TRI-CYCLEN, 28,) 0.18/0.215/0.25 MG-35 MCG TABS Use 1 tablet daily as directed.  11/1/21   AMBER Zavala - CNP   vitamin B-12 (CYANOCOBALAMIN) 100 MCG tablet Take 50 mcg by mouth daily    Historical Provider, MD   trandolapril (MAVIK) 4 MG tablet Take 4 mg by mouth daily    Historical Provider, MD   Multiple Vitamin (MULTIVITAMIN) TABS  2/22/21   Historical Provider, MD   famotidine (PEPCID) 20 MG tablet Take 1 tablet by mouth 2 times daily 3/10/21 11/9/21  AMBER Zavala - CNP       REVIEW OF SYSTEMS    (2-9 systems for level 4, 10 or more for level 5)      Review of Systems   General ROS - No fevers, No malaise  Psychological ROS - No Headache  Ophthalmic ROS - No discharge, No changes in vision  ENT ROS -  No sore throat, No rhinorrhea,   Respiratory ROS - no cough, No shortness of breath, or wheezing  Cardiovascular ROS - No chest pain or dyspnea on exertion  Gastrointestinal ROS - No abdominal pain, change in bowel habits, or black or bloody stools  Genito-Urinary ROS - No dysuria, trouble voiding, or hematuria  Musculoskeletal ROS - No myalgias,+ arthalgias  Neurological ROS - No focal weakness or loss of sensation  Dermatological ROS - No lesions, No rash      PHYSICAL EXAM   (up to 7 for level 4, 8 or more for level 5)      INITIAL VITALS:   /72   Pulse 86   Temp 97.2 °F (36.2 °C) (Oral)   Resp 16   Ht 5' 4\" (1.626 m)   Wt 175 lb (79.4 kg)   [facility] 11/27/2021   SpO2 98%   BMI 30.04 kg/m²     Physical Exam  Physical Exam __  Constitutional:  oriented to person, place, and time. appears well-developed and well-nourished. HENT: no facial swelling or edema  Head: Normocephalic and atraumatic. Eyes: Right eye exhibits no discharge. Left eye exhibits no discharge. No scleral icterus. Neck: No JVD present. No tracheal deviation present. Cardiovascular: pulses present in extremities  Pulmonary/Chest: Effort normal. No respiratory distress. Abdomen: soft non tender,without any rebound rigidity guarding or peritoneal signs no signs of trauma, no cva tenderness b/l  Musculoskeletal: Normal range of motion. Slight left ankle swelling  Neurological: they are alert and oriented to person, place, and time. Skin: Skin is warm and dry. Psychiatric: has a normal mood and affect. behavior is normal.    DIFFERENTIAL  DIAGNOSIS     PLAN (LABS / IMAGING / EKG):  Orders Placed This Encounter   Procedures    XR ANKLE LEFT (MIN 3 VIEWS)    XR FOOT LEFT (MIN 3 VIEWS)    Ice to affected area    [employer] Ankle Brace, Left       MEDICATIONS ORDERED:  Orders Placed This Encounter   Medications    naproxen (NAPROSYN) tablet 500 mg    naproxen (NAPROSYN) 500 MG tablet     Sig: Take 1 tablet by mouth 2 times daily (with meals)     Dispense:  20 tablet     Refill:  0       DDX: ankle sprain    DIAGNOSTIC RESULTS / EMERGENCY DEPARTMENT COURSE / MDM   LAB RESULTS:  No results found for this visit on 12/03/21. IMPRESSION: Alert oriented nontoxic 42-year-old female no acute distress with left ankle pain swelling in the area but motor and sensory function are intact distally she is ambulatory. Plan will be ice naproxen x-ray imaging anticipate discharge    RADIOLOGY:  XR ANKLE LEFT (MIN 3 VIEWS)    Result Date: 12/4/2021  EXAMINATION: THREE XRAY VIEWS OF THE LEFT ANKLE 12/4/2021 12:13 am COMPARISON: None.  HISTORY: ORDERING SYSTEM PROVIDED HISTORY: pain swelling inversion injury TECHNOLOGIST PROVIDED HISTORY: pain swelling inversion injury FINDINGS: No evidence of acute fracture or dislocation. Normal alignment of the ankle mortise. No focal osseous lesion. No evidence of joint effusion. No focal soft tissue abnormality. No acute abnormality of the ankle. XR FOOT LEFT (MIN 3 VIEWS)    Result Date: 12/4/2021  EXAMINATION: THREE XRAY VIEWS OF THE LEFT FOOT 12/4/2021 12:13 am COMPARISON: None. HISTORY: ORDERING SYSTEM PROVIDED HISTORY: pain swelling TECHNOLOGIST PROVIDED HISTORY: pain swelling FINDINGS: There is no evidence of acute fracture. There is normal alignment of the tarsometatarsal joints. No acute joint abnormality. No focal osseous lesion. No focal soft tissue abnormality. No acute osseous abnormality. EKG      All EKG's are interpreted by the Emergency Department Physician who either signs or Co-signs this chart in the absence of a cardiologist.    EMERGENCY DEPARTMENT COURSE:  Seen and evaluated history physical exam signs and symptoms are consistent with an ankle sprain x-ray imaging demonstrates no bony abnormality patient discharged home in stable condition with a brace naproxen instructions for exercises to rehab the ankle and outpatient follow-up    PROCEDURES:      CONSULTS:  None    CRITICAL CARE:      FINAL IMPRESSION      1.  Sprain of left ankle, unspecified ligament, initial encounter          DISPOSITION / PLAN     DISPOSITION  IA      PATIENT REFERRED TO:  AMBER Sarah [address] [id number] [phone]    Call today  for followup and reevaluation in 1-2 days    [facility] ED  [address] [facility]  [phone]  Go to   If symptoms worsen, As needed      DISCHARGE MEDICATIONS:  New Prescriptions    NAPROXEN (NAPROSYN) 500 MG TABLET    Take 1 tablet by mouth 2 times daily (with meals)       Doyle Isabel,   Emergency Medicine Resident    (Please note that portions of thisnote were completed with a voice recognition program.  Efforts were made to edit the dictations but occasionally words are mis-transcribed.)       Chantel Merino DO  Resident  12/04/21 [id number]

## 2021-12-30 ENCOUNTER — OFFICE VISIT (OUTPATIENT)
Dept: PRIMARY CARE CLINIC | Age: 26
End: 2021-12-30
Payer: MEDICARE

## 2021-12-30 VITALS
TEMPERATURE: 97 F | OXYGEN SATURATION: 97 % | DIASTOLIC BLOOD PRESSURE: 75 MMHG | HEART RATE: 90 BPM | SYSTOLIC BLOOD PRESSURE: 101 MMHG

## 2021-12-30 DIAGNOSIS — J02.0 STREP PHARYNGITIS: Primary | ICD-10-CM

## 2021-12-30 LAB — S PYO AG THROAT QL: POSITIVE

## 2021-12-30 PROCEDURE — 4004F PT TOBACCO SCREEN RCVD TLK: CPT | Performed by: INTERNAL MEDICINE

## 2021-12-30 PROCEDURE — 87880 STREP A ASSAY W/OPTIC: CPT | Performed by: INTERNAL MEDICINE

## 2021-12-30 PROCEDURE — G8484 FLU IMMUNIZE NO ADMIN: HCPCS | Performed by: INTERNAL MEDICINE

## 2021-12-30 PROCEDURE — G8427 DOCREV CUR MEDS BY ELIG CLIN: HCPCS | Performed by: INTERNAL MEDICINE

## 2021-12-30 PROCEDURE — G8417 CALC BMI ABV UP PARAM F/U: HCPCS | Performed by: INTERNAL MEDICINE

## 2021-12-30 PROCEDURE — 99203 OFFICE O/P NEW LOW 30 MIN: CPT | Performed by: INTERNAL MEDICINE

## 2021-12-30 RX ORDER — PREDNISONE 20 MG/1
20 TABLET ORAL DAILY
Qty: 3 TABLET | Refills: 0 | Status: SHIPPED | OUTPATIENT
Start: 2021-12-30 | End: 2022-01-02

## 2021-12-30 RX ORDER — AMOXICILLIN 500 MG/1
500 CAPSULE ORAL 3 TIMES DAILY
Qty: 21 CAPSULE | Refills: 0 | Status: SHIPPED | OUTPATIENT
Start: 2021-12-30 | End: 2022-01-06

## 2021-12-30 ASSESSMENT — ENCOUNTER SYMPTOMS: SORE THROAT: 1

## 2021-12-30 NOTE — PROGRESS NOTES
[facility]  [facility] IN [facility]  [address] [place] [address]  Dept: [phone]  Dept Fax: [phone]    Philippe Galindo is a 23 y.o. female who presents to the urgent care today for her medicalconditions/complaints as noted below. Philippe Galindo is c/o of Pharyngitis (swelling)      HPI:     Pharyngitis  This is a new problem. The current episode started today. The problem occurs constantly. The problem has been unchanged. Associated symptoms include a sore throat (feels swollen). Nothing aggravates the symptoms. She has tried nothing for the symptoms. The treatment provided no relief. Positive for strep. Not vaccinated for COVID. Past Medical History:   Diagnosis Date    Anxiety     Depression     Endocarditis     Hepatitis C     Overdose         Current Outpatient Medications   Medication Sig Dispense Refill    naproxen (NAPROSYN) 500 MG tablet Take 1 tablet by mouth 2 times daily (with meals) 20 tablet 0    divalproex (DEPAKOTE) 250 MG DR tablet       mirtazapine (REMERON) 45 MG tablet       Norgestim-Eth Estrad Triphasic (ORTHO TRI-CYCLEN, 28,) 0.18/0.215/0.25 MG-35 MCG TABS Use 1 tablet daily as directed. 28 tablet 3    vitamin B-12 (CYANOCOBALAMIN) 100 MCG tablet Take 50 mcg by mouth daily      trandolapril (MAVIK) 4 MG tablet Take 4 mg by mouth daily      Multiple Vitamin (MULTIVITAMIN) TABS       famotidine (PEPCID) 20 MG tablet Take 1 tablet by mouth 2 times daily 60 tablet 2    predniSONE (DELTASONE) 20 MG tablet Take 1 tablet by mouth daily for 3 days 3 tablet 0     No current facility-administered medications for this visit.      No Known Allergies    Health Maintenance   Topic Date Due    COVID-19 Vaccine (1) Never done    Pneumococcal 0-64 years Vaccine (1 of 2 - PPSV23) Never done    Creatinine monitoring  09/18/2021    DTaP/Tdap/Td vaccine (6 - Td or Tdap) 02/04/2022 (Originally 10/8/2017)    Flu vaccine (1) 11/09/2022 (Originally 9/1/2021)  Potassium monitoring  04/02/2022    Pap smear  11/16/2023    Hepatitis A vaccine  Completed    Hepatitis B vaccine  Completed    HPV vaccine  Completed    Varicella vaccine  Completed    HIV screen  Completed    Hib vaccine  Aged Out    Meningococcal (ACWY) vaccine  Aged Out       Subjective:      Review of Systems   HENT: Positive for sore throat (feels swollen). All other systems reviewed and are negative. Objective:     Physical Exam  Vitals reviewed. Constitutional:       Appearance: Normal appearance. HENT:      Head: Normocephalic and atraumatic. Skin:     General: Skin is warm and dry. Neurological:      General: No focal deficit present. Mental Status: She is alert and oriented to person, place, and time. Psychiatric:         Mood and Affect: Mood normal.         Behavior: Behavior normal.       /75   Pulse 90   Temp 97 °F (36.1 °C) (Temporal)   SpO2 97%       Assessment:       Diagnosis Orders   1. Pharyngitis, unspecified etiology  POCT rapid strep A       Plan:      No follow-ups on file. Orders Placed This Encounter   Medications    predniSONE (DELTASONE) 20 MG tablet     Sig: Take 1 tablet by mouth daily for 3 days     Dispense:  3 tablet     Refill:  0     Orders Placed This Encounter   Procedures    POCT rapid strep A            Patient given educational materials - see patient instructions. Discussed use, benefit, and side effects of prescribed medications. All patientquestions answered. Pt voiced understanding.     Electronically signed by James Bonilla MD on 12/30/2021at 10:03 AM 4y6m M presenting BIB mother for covid testing. As per mother pt has had cough since last night and fever 100.3, given tylenol last night. No fever in ED. Pt eating and drinking well as per mother. No sick contacts. Denies sob, throat pain, ear pain, rash, n/v, abdominal pain. UTD on vaccines

## 2022-01-03 ENCOUNTER — TELEPHONE (OUTPATIENT)
Dept: GASTROENTEROLOGY | Age: 27
End: 2022-01-03

## 2022-01-03 ENCOUNTER — TELEPHONE (OUTPATIENT)
Dept: PRIMARY CARE CLINIC | Age: 27
End: 2022-01-03

## 2022-01-03 DIAGNOSIS — U07.1 COVID-19 VIRUS INFECTION: Primary | ICD-10-CM

## 2022-01-03 RX ORDER — BENZONATATE 100 MG/1
100 CAPSULE ORAL 3 TIMES DAILY PRN
Qty: 30 CAPSULE | Refills: 0 | Status: SHIPPED | OUTPATIENT
Start: 2022-01-03 | End: 2022-01-10

## 2022-01-03 NOTE — TELEPHONE ENCOUNTER
Based on her symptoms I would suggest a Covid swab, I can place an order because she was seen by walk-in clinic in the last week for symptoms.

## 2022-01-03 NOTE — TELEPHONE ENCOUNTER
Continue with symptom management. I will send a medication to help with the cough, however it varies in effectiveness. Tylenol for headache and body aches. Continue to hydrate as much as possible. Quarantine per [place] and make sure she notifies anybody she has been in contact with since onset of symptoms.

## 2022-01-12 ENCOUNTER — TELEPHONE (OUTPATIENT)
Dept: PRIMARY CARE CLINIC | Age: 27
End: 2022-01-12

## 2022-01-12 DIAGNOSIS — K59.00 CONSTIPATION, UNSPECIFIED CONSTIPATION TYPE: Primary | ICD-10-CM

## 2022-01-12 RX ORDER — DOCUSATE SODIUM 100 MG/1
100 CAPSULE, LIQUID FILLED ORAL 2 TIMES DAILY PRN
Qty: 20 CAPSULE | Refills: 0 | Status: SHIPPED | OUTPATIENT
Start: 2022-01-12 | End: 2022-01-22

## 2022-01-12 RX ORDER — DOCUSATE SODIUM 100 MG/1
100 CAPSULE, LIQUID FILLED ORAL 2 TIMES DAILY PRN
Qty: 20 CAPSULE | Refills: 0 | Status: SHIPPED | OUTPATIENT
Start: 2022-01-12 | End: 2022-01-12

## 2022-01-12 NOTE — TELEPHONE ENCOUNTER
Patient called asking for script for colace, patient states she has not been able to have a bowel movement in four days. Please review and advise.

## 2022-01-13 ENCOUNTER — TELEPHONE (OUTPATIENT)
Dept: PRIMARY CARE CLINIC | Age: 27
End: 2022-01-13

## 2022-01-13 NOTE — TELEPHONE ENCOUNTER
Pt called stating that she hasn't had a bowel movement in 5 days and would like a laxative prescribed

## 2022-01-17 NOTE — TELEPHONE ENCOUNTER
Please contact patient, verify if laxative is still needed or if she was able to have bowel movement with use of stool softener sent in last week

## 2022-01-18 ENCOUNTER — TELEPHONE (OUTPATIENT)
Dept: GASTROENTEROLOGY | Age: 27
End: 2022-01-18

## 2022-01-18 NOTE — TELEPHONE ENCOUNTER
Called pt to reschedule appt on 01/19/22, pt states she just needs to be seen to get cleared for something regarding bc and she stated she would like to switch, advised pt she could do a 1 time switch, pt understood, pt switched to Dr. Jose Dougherty to be seen 01/19/22.

## 2022-01-21 ENCOUNTER — TELEPHONE (OUTPATIENT)
Dept: PRIMARY CARE CLINIC | Age: 27
End: 2022-01-21

## 2022-01-21 ENCOUNTER — OFFICE VISIT (OUTPATIENT)
Dept: GASTROENTEROLOGY | Age: 27
End: 2022-01-21
Payer: MEDICARE

## 2022-01-21 ENCOUNTER — HOSPITAL ENCOUNTER (OUTPATIENT)
Age: 27
Discharge: HOME | End: 2022-01-21
Payer: MEDICARE

## 2022-01-21 VITALS — WEIGHT: 175 LBS | DIASTOLIC BLOOD PRESSURE: 78 MMHG | SYSTOLIC BLOOD PRESSURE: 120 MMHG | BODY MASS INDEX: 30.04 KG/M2

## 2022-01-21 DIAGNOSIS — Z30.09 BIRTH CONTROL COUNSELING: Primary | ICD-10-CM

## 2022-01-21 DIAGNOSIS — B18.2 CHRONIC HEPATITIS C WITHOUT HEPATIC COMA (HCC): ICD-10-CM

## 2022-01-21 DIAGNOSIS — B18.2 CHRONIC HEPATITIS C WITHOUT HEPATIC COMA (HCC): Primary | ICD-10-CM

## 2022-01-21 LAB
ALBUMIN SERPL-MCNC: 4.5 G/DL (ref 3.5–5.2)
ALBUMIN/GLOBULIN RATIO: 1.2 (ref 1–2.5)
ALP BLD-CCNC: 62 U/L (ref 35–104)
ALT SERPL-CCNC: 30 U/L (ref 5–33)
AST SERPL-CCNC: 30 U/L
BILIRUB SERPL-MCNC: 0.23 MG/DL (ref 0.3–1.2)
BILIRUBIN DIRECT: 0.08 MG/DL
BILIRUBIN, INDIRECT: 0.15 MG/DL (ref 0–1)
GLOBULIN: ABNORMAL G/DL (ref 1.5–3.8)
TOTAL PROTEIN: 8.4 G/DL (ref 6.4–8.3)

## 2022-01-21 PROCEDURE — G8484 FLU IMMUNIZE NO ADMIN: HCPCS | Performed by: INTERNAL MEDICINE

## 2022-01-21 PROCEDURE — 36415 COLL VENOUS BLD VENIPUNCTURE: CPT

## 2022-01-21 PROCEDURE — G8427 DOCREV CUR MEDS BY ELIG CLIN: HCPCS | Performed by: INTERNAL MEDICINE

## 2022-01-21 PROCEDURE — 99213 OFFICE O/P EST LOW 20 MIN: CPT | Performed by: INTERNAL MEDICINE

## 2022-01-21 PROCEDURE — 80076 HEPATIC FUNCTION PANEL: CPT

## 2022-01-21 PROCEDURE — G8417 CALC BMI ABV UP PARAM F/U: HCPCS | Performed by: INTERNAL MEDICINE

## 2022-01-21 PROCEDURE — 4004F PT TOBACCO SCREEN RCVD TLK: CPT | Performed by: INTERNAL MEDICINE

## 2022-01-21 PROCEDURE — 87522 HEPATITIS C REVRS TRNSCRPJ: CPT

## 2022-01-21 ASSESSMENT — ENCOUNTER SYMPTOMS
EYES NEGATIVE: 1
ALLERGIC/IMMUNOLOGIC NEGATIVE: 1
RESPIRATORY NEGATIVE: 1
GASTROINTESTINAL NEGATIVE: 1

## 2022-01-21 NOTE — PROGRESS NOTES
GI FOLLOW UP    INTERVAL HISTORY:     Chronic hepatitis C  Completed 6 weeks of Mavyret  Undetectable viral load in November 2021    Chief Complaint   Patient presents with    Follow-up     hep c f/u    Hepatitis C     Treatment was not fully completed, but patient RNA is currently non detected.  Other     Patient is here today to be cleared from GI so she can get Nexplanon birth control implant     Gastroesophageal Reflux     Patient no longer taking pepcid 20mg. States using TUMS as needed. 1. Chronic hepatitis C without hepatic coma (HCC)          HISTORY OF PRESENT ILLNESS: [facility] a 26 y. o. female with a past history remarkable for endocarditis several years ago, on heart valve, referred for evaluation of chronic HCV.       Smoker: Active smoker, 5 cig/day, x 8 yrs.   Drinking history: socially  Illicit drugs: Heroin IV, crystal meth. Nupur Fern in the past.  Last use of IV drugs approximately 3 months ago  Abdominal surgeries: No abdominal surgeries  Prior Colonoscopy: N  Prior EGD:N  FH of GI issues: None    Past Medical,Family, and Social History reviewed and does contribute to the patient presenting condition. Patient's PMH/PSH,SH,PSYCH Hx, MEDs, ALLERGIES, and ROS were all reviewed and updated in the appropriate sections.     PAST MEDICAL HISTORY:  Past Medical History:   Diagnosis Date    Anxiety     Depression     Endocarditis     Hepatitis C     Overdose        Past Surgical History:   Procedure Laterality Date    ADENOIDECTOMY         CURRENT MEDICATIONS:    Current Outpatient Medications:     docusate sodium (COLACE) 100 MG capsule, Take 1 capsule by mouth 2 times daily as needed for Constipation, Disp: 20 capsule, Rfl: 0    naproxen (NAPROSYN) 500 MG tablet, Take 1 tablet by mouth 2 times daily (with meals), Disp: 20 tablet, Rfl: 0    divalproex (DEPAKOTE) 250 MG DR tablet, , Disp: , Rfl:     mirtazapine (REMERON) 45 MG tablet, , Disp: , Rfl:     Norgestim-Eth Estrad Triphasic (ORTHO TRI-CYCLEN, 28,) 0.18/0.215/0.25 MG-35 MCG TABS, Use 1 tablet daily as directed., Disp: 28 tablet, Rfl: 3    vitamin B-12 (CYANOCOBALAMIN) 100 MCG tablet, Take 50 mcg by mouth daily, Disp: , Rfl:     trandolapril (MAVIK) 4 MG tablet, Take 4 mg by mouth daily, Disp: , Rfl:     Multiple Vitamin (MULTIVITAMIN) TABS, , Disp: , Rfl:     famotidine (PEPCID) 20 MG tablet, Take 1 tablet by mouth 2 times daily, Disp: 60 tablet, Rfl: 2    ALLERGIES:   No Known Allergies    FAMILY HISTORY:       Problem Relation Age of Onset    Breast Cancer Mother 28    COPD Father     Asthma Brother     No Known Problems Maternal Grandmother     No Known Problems Maternal Grandfather     No Known Problems Paternal Grandmother     No Known Problems Paternal Grandfather     Colon Cancer Neg Hx     Ovarian Cancer Neg Hx          SOCIAL HISTORY:   Social History     Socioeconomic History    Marital status: Single     Spouse name: Not on file    Number of children: Not on file    Years of education: Not on file    Highest education level: Not on file   Occupational History    Not on file   Tobacco Use    Smoking status: Current Every Day Smoker     Packs/day: 0.25     Years: 8.00     Pack years: 2.00     Types: Cigarettes    Smokeless tobacco: Never Used   Vaping Use    Vaping Use: Every day   Substance and Sexual Activity    Alcohol use: Not Currently    Drug use: Not Currently     Comment: currently in recovery. Last use 07/24/2020.     Sexual activity: Not on file   Other Topics Concern    Not on file   Social History Narrative    Not on file     Social Determinants of Health     Financial Resource Strain:     Difficulty of Paying Living Expenses: Not on file   Food Insecurity: No Food Insecurity    Worried About Running Out of Food in the Last Year: Never true    Yosvany of Food in the Last Year: Never true   Transportation Needs:     Lack of Transportation (Medical): Not on file    Lack of Transportation (Non-Medical): Not on file   Physical Activity:     Days of Exercise per Week: Not on file    Minutes of Exercise per Session: Not on file   Stress:     Feeling of Stress : Not on file   Social Connections:     Frequency of Communication with Friends and Family: Not on file    Frequency of Social Gatherings with Friends and Family: Not on file    Attends Restorationist Services: Not on file    Active Member of [address] [place] [address] [employer] or Organizations: Not on file    Attends Club or Organization Meetings: Not on file    Marital Status: Not on file   Intimate Partner Violence:     Fear of Current or Ex-Partner: Not on file    Emotionally Abused: Not on file    Physically Abused: Not on file    Sexually Abused: Not on file   Housing Stability:     Unable to Pay for Housing in the Last Year: Not on file    Number of [place] in the Last Year: Not on file    Unstable Housing in the Last Year: Not on file       REVIEW OF SYSTEMS: A 12-point review of systems was obtained and pertinent positives and negatives were listed below. REVIEW OF SYSTEMS:     Constitutional: No fever, no chills, no lethargy, no weakness. HEENT:  No headache, otalgia, itchy eyes, nasal discharge or sore throat. Cardiac:  No chest pain, dyspnea, orthopnea or PND. Chest:   No cough, phlegm or wheezing. Abdomen:      Detailed by MA   Neuro:  No focal weakness, abnormal movements or seizure like activity. Skin:   No rashes, no itching. :   No hematuria, no pyuria, no dysuria, no flank pain. Extremities:  No swelling or joint pains. ROS was otherwise negative    Review of Systems   Constitutional: Negative. HENT: Negative. Eyes: Negative. Respiratory: Negative. Cardiovascular: Negative. Gastrointestinal: Negative. Endocrine: Negative. Genitourinary: Negative. Musculoskeletal: Negative. Skin: Negative. Allergic/Immunologic: Negative. Neurological: Negative. Hematological: Negative. Psychiatric/Behavioral: Negative. All other systems reviewed and are negative. PHYSICAL EXAMINATION: Vital signs reviewed per the nursing documentation. /78   Wt 175 lb (79.4 kg)   BMI 30.04 kg/m²   Body mass index is 30.04 kg/m². Physical Exam    Physical Exam   Constitutional: Patient is oriented to person, place, and time. Patient appears well-developed and well-nourished. HENT:   Head: Normocephalic and atraumatic. Eyes: Pupils are equal, round, and reactive to light. EOM are normal.   Neck: Normal range of motion. Neck supple. No JVD present. No tracheal deviation present. No thyromegaly present. Cardiovascular: Normal rate, regular rhythm, normal heart sounds and intact distal pulses. Pulmonary/Chest: Effort normal and breath sounds normal. No stridor. No respiratory distress. He has no wheezes. He has no rales. He exhibits no tenderness. Abdominal: Soft. Bowel sounds are normal. He exhibits no distension and no mass. There is no tenderness. There is no rebound and no guarding. No hernia. Musculoskeletal: Normal range of motion. Lymphadenopathy:    Patient has no cervical adenopathy. Neurological: Patient is alert and oriented to person, place, and time. Psychiatric: Patient has a normal mood and affect.  Patient behavior is normal.       LABORATORY DATA: Reviewed  Lab Results   Component Value Date    WBC 5.5 04/02/2021    HGB 12.7 04/02/2021    HCT 38.7 04/02/2021    MCV 95.8 04/02/2021     04/02/2021     04/02/2021    K 4.4 04/02/2021     04/02/2021    CO2 24 04/02/2021    BUN 14 04/02/2021    CREATININE 0.77 04/02/2021    LABALBU 4.4 04/02/2021    BILITOT 0.39 04/02/2021    ALKPHOS 55 04/02/2021    AST 70 (H) 04/02/2021     (H) 04/02/2021    INR 0.9 09/28/2020         Lab Results   Component Value Date    RBC 4.04 04/02/2021    HGB 12.7 04/02/2021    MCV 95.8 04/02/2021    MCH 31.4 04/02/2021    MCHC 32.8 04/02/2021    RDW 13.2 04/02/2021    MPV 10.7 04/02/2021    BASOPCT 1 04/02/2021    LYMPHSABS 1.92 04/02/2021    MONOSABS 0.72 04/02/2021    NEUTROABS 2.72 04/02/2021    EOSABS 0.09 04/02/2021    BASOSABS 0.05 04/02/2021         DIAGNOSTIC TESTING:     No results found. IMPRESSION: Ms.Tiffaney Boyd is a 23 y. o. female with a past history remarkable for endocarditis several years ago, valvular disease, referred for evaluation of chronic HCV treatment. Genotype 3, noncirrhotic. Abstaining from illicit drugs and alcohol for several months, approximately 6 or more     Completed 6 weeks of Mavyret    Assessment  1. Chronic hepatitis C without hepatic coma (HCC)        PLAN:    1) Chronic HCV--- treated. Undetectable viral load in November 2021. We will repeat viral load and LFTs. Repeat liver ultrasound and AFP. 2) follow-up serological test, if unremarkable okay to start birth control medication        Thank you for allowing me to participate in the care of Ms. Boyd. For any further questions please do not hesitate to contact me. I have reviewed and agree with the ROS entered by the MA/LPN from today's encounter documented in a separate note. Lux Preston MD, MPH   [facility]  Office #: [phone]    this note is created with the assistance of a speech recognition program.  While intending to generate a document that actually reflects the content of the visit, the document can still have some errors including those of syntax and sound a like substitutions which may escape proof reading. It such instances, actual meaning can be extrapolated by contextual diversion.

## 2022-01-24 NOTE — TELEPHONE ENCOUNTER
Spoke with patient, Informed of referral and phone number given. Voiced understanding. No questions asked at time of call.

## 2022-01-26 LAB
DIRECT EXAM: NORMAL
Lab: NORMAL
SPECIMEN DESCRIPTION: NORMAL

## 2022-02-16 DIAGNOSIS — L70.9 ACNE, UNSPECIFIED ACNE TYPE: ICD-10-CM

## 2022-02-16 DIAGNOSIS — Z30.019 ENCOUNTER FOR FEMALE BIRTH CONTROL: ICD-10-CM

## 2022-02-16 RX ORDER — NORGESTIMATE AND ETHINYL ESTRADIOL 7DAYSX3 28
KIT ORAL
Qty: 28 TABLET | Refills: 3 | Status: SHIPPED | OUTPATIENT
Start: 2022-02-16 | End: 2022-02-23 | Stop reason: SDUPTHER

## 2022-02-16 NOTE — TELEPHONE ENCOUNTER
Health Maintenance   Topic Date Due    COVID-19 Vaccine (1) Never done    Pneumococcal 0-64 years Vaccine (1 of 2 - PPSV23) Never done    Depression Screen  Never done    DTaP/Tdap/Td vaccine (6 - Td or Tdap) 10/08/2017    Creatinine monitoring  09/18/2021    Flu vaccine (1) 11/09/2022 (Originally 9/1/2021)    Potassium monitoring  04/02/2022    Pap smear  11/16/2023    Hepatitis A vaccine  Completed    Hepatitis B vaccine  Completed    HPV vaccine  Completed    Varicella vaccine  Completed    HIV screen  Completed    Hib vaccine  Aged Out    Meningococcal (ACWY) vaccine  Aged Out             (applicable per patient's age: Cancer Screenings, Depression Screening, Fall Risk Screening, Immunizations)    AST (U/L)   Date Value   01/21/2022 30     ALT (U/L)   Date Value   01/21/2022 30     BUN (mg/dL)   Date Value   04/02/2021 14      (goal A1C is < 7)   (goal LDL is <100) need 30-50% reduction from baseline     BP Readings from Last 3 Encounters:   01/21/22 120/78   12/30/21 101/75   12/03/21 109/72    (goal /80)      All Future Testing planned in [facility]:  Lab Frequency Next Occurrence   US ABDOMEN LIMITED Once 01/21/2022       Next Visit Date:  No future appointments.          Patient Active Problem List:     Chronic hepatitis C without hepatic coma ([address]     Insomnia     Family history of breast cancer in mother     Substance abuse ([address]     Anxiety     Ambien accidental overdose     Accidental overdose of heroin ([address]     Endocarditis     Septic pulmonary embolism without acute cor pulmonale (HCC)     Non-rheumatic tricuspid valve insufficiency

## 2022-02-23 DIAGNOSIS — Z30.019 ENCOUNTER FOR FEMALE BIRTH CONTROL: ICD-10-CM

## 2022-02-23 DIAGNOSIS — L70.9 ACNE, UNSPECIFIED ACNE TYPE: ICD-10-CM

## 2022-02-23 RX ORDER — NORGESTIMATE AND ETHINYL ESTRADIOL 7DAYSX3 28
KIT ORAL
Qty: 28 TABLET | Refills: 3 | Status: SHIPPED | OUTPATIENT
Start: 2022-02-23

## 2022-10-02 ENCOUNTER — HOSPITAL ENCOUNTER (EMERGENCY)
Facility: CLINIC | Age: 27
Discharge: HOME | End: 2022-10-02
Attending: EMERGENCY MEDICINE
Payer: MEDICARE

## 2022-10-02 VITALS
RESPIRATION RATE: 18 BRPM | DIASTOLIC BLOOD PRESSURE: 85 MMHG | BODY MASS INDEX: 22.14 KG/M2 | HEART RATE: 63 BPM | TEMPERATURE: 98.2 F | WEIGHT: 129 LBS | OXYGEN SATURATION: 100 % | SYSTOLIC BLOOD PRESSURE: 131 MMHG

## 2022-10-02 DIAGNOSIS — T40.601A OPIATE OVERDOSE, ACCIDENTAL OR UNINTENTIONAL, INITIAL ENCOUNTER (HCC): Primary | ICD-10-CM

## 2022-10-02 PROCEDURE — 99283 EMERGENCY DEPT VISIT LOW MDM: CPT

## 2022-10-02 ASSESSMENT — ENCOUNTER SYMPTOMS
RESPIRATORY NEGATIVE: 1
EYES NEGATIVE: 1
GASTROINTESTINAL NEGATIVE: 1

## 2022-10-02 NOTE — ED PROVIDER NOTES
[address] ED  EMERGENCY DEPARTMENT ENCOUNTER      Pt Name: Antoine Stewart  MRN: [id number]  [place] 1995  Date of evaluation: 10/2/2022  Provider: AMBER Zuniga CNP    CHIEF COMPLAINT       Chief Complaint   Patient presents with    Drug Overdose         HISTORY OF PRESENT ILLNESS   (Location/Symptom, Timing/Onset, Context/Setting, Quality, Duration, Modifying Factors, Severity)  Note limiting factors. Antoine Stewart is a 33 y.o. female who presents to the emergency department for evaluation of overdose of heroin. States she snorted too much. Someone found them and called 911. EMS administered Narcan at approximately 1915. She has no complaints other than she is cold. She denies chest pain, nausea, vomiting. Reports headache. She admits that she has previously overdosed and has         Nursing Notes were reviewed. REVIEW OF SYSTEMS    (2-9 systems for level 4, 10 or more for level 5)     Review of Systems   Constitutional: Negative. HENT: Negative. Eyes: Negative. Respiratory: Negative. Cardiovascular: Negative. Gastrointestinal: Negative. Endocrine: Negative. Musculoskeletal: Negative. Skin: Negative. Neurological: Negative. Except as noted above the remainder of the review of systems was reviewed and negative.        PAST MEDICAL HISTORY     Past Medical History:   Diagnosis Date    Anxiety     Depression     Endocarditis     Hepatitis C     Overdose          SURGICAL HISTORY       Past Surgical History:   Procedure Laterality Date    ADENOIDECTOMY           CURRENT MEDICATIONS       Previous Medications    DIVALPROEX (DEPAKOTE) 250 MG DR TABLET        FAMOTIDINE (PEPCID) 20 MG TABLET    Take 1 tablet by mouth 2 times daily    MIRTAZAPINE (REMERON) 45 MG TABLET        MULTIPLE VITAMIN (MULTIVITAMIN) TABS        NAPROXEN (NAPROSYN) 500 MG TABLET    Take 1 tablet by mouth 2 times daily (with meals)    NORGESTIM-ETH ESTRAD TRIPHASIC (ORTHO TRI-CYCLEN, 28,) 0.18/0.215/0.25 MG-35 MCG TABS    Use 1 tablet daily as directed. TRANDOLAPRIL (MAVIK) 4 MG TABLET    Take 4 mg by mouth daily    VITAMIN B-12 (CYANOCOBALAMIN) 100 MCG TABLET    Take 50 mcg by mouth daily       ALLERGIES     Patient has no known allergies. FAMILY HISTORY       Family History   Problem Relation Age of Onset    Breast Cancer Mother 28    COPD Father     Asthma Brother     No Known Problems Maternal Grandmother     No Known Problems Maternal Grandfather     No Known Problems Paternal Grandmother     No Known Problems Paternal Grandfather     Colon Cancer Neg Hx     Ovarian Cancer Neg Hx           SOCIAL HISTORY       Social History     Socioeconomic History    Marital status: Single     Spouse name: None    Number of children: None    Years of education: None    Highest education level: None   Tobacco Use    Smoking status: Every Day     Packs/day: 0.25     Years: 8.00     Pack years: 2.00     Types: Cigarettes    Smokeless tobacco: Never   Vaping Use    Vaping Use: Every day   Substance and Sexual Activity    Alcohol use: Not Currently    Drug use: Not Currently     Comment: currently in recovery. Last use 07/24/2020. SCREENINGS                               CIWA Assessment  BP: 131/85  Heart Rate: 63                 PHYSICAL EXAM    (up to 7 for level 4, 8 or more for level 5)     ED Triage Vitals [10/02/22 1936]   BP Temp Temp Source Heart Rate Resp SpO2 Height Weight   131/85 98.2 °F (36.8 °C) Oral 63 18 100 % -- 129 lb (58.5 kg)       Physical Exam  Vitals and nursing note reviewed. Constitutional:       Appearance: Normal appearance. HENT:      Head: Normocephalic and atraumatic. Nose: Nose normal.      Mouth/Throat:      Mouth: Mucous membranes are moist.      Pharynx: Oropharynx is clear. Eyes:      Extraocular Movements: Extraocular movements intact. Pupils: Pupils are equal, round, and reactive to light.    Cardiovascular:      Rate and Rhythm: Normal rate and regular rhythm. Heart sounds: Normal heart sounds. Pulmonary:      Effort: Pulmonary effort is normal. No respiratory distress. Breath sounds: Normal breath sounds. No stridor. Chest:      Chest wall: No tenderness. Abdominal:      General: Abdomen is flat. Palpations: Abdomen is soft. Musculoskeletal:         General: Normal range of motion. Cervical back: Normal range of motion. Skin:     General: Skin is warm and dry. Neurological:      General: No focal deficit present. Mental Status: She is alert and oriented to person, place, and time. Psychiatric:         Mood and Affect: Mood normal.       DIAGNOSTIC RESULTS     EKG: All EKG's are interpreted by the Emergency Department Physician who either signs or Co-signs this chart in the absence of a cardiologist.        RADIOLOGY:   Non-plain film images such as CT, Ultrasound and MRI are read by the radiologist. Plain radiographic images are visualized and preliminarily interpreted by the emergency physician with the below findings:        Interpretation per the Radiologist below, if available at the time of this note:    No orders to display         ED BEDSIDE ULTRASOUND:   Performed by ED Physician - none    LABS:  Labs Reviewed - No data to display    All other labs were within normal range or not returned as of this dictation. EMERGENCY DEPARTMENT COURSE and DIFFERENTIAL DIAGNOSIS/MDM:   Vitals:    Vitals:    10/02/22 1936   BP: 131/85   Pulse: 63   Resp: 18   Temp: 98.2 °F (36.8 °C)   TempSrc: Oral   SpO2: 100%   Weight: 58.5 kg (129 lb)           MDM    Awake alert, oxygen saturation remains between 96 and 100%. She is arousable easily to verbal stimuli and is awake and alert. She has been resting comfortably and is in no acute distress.   She s discharged home with instructions on what to watch out for and when to return they have Narcan at home and were counseled on drug abuse misuse and the fact that it could result in death    [facility]       CONSULTS:  None    PROCEDURES:  Unless otherwise noted below, none     Procedures        FINAL IMPRESSION      1. Opiate overdose, accidental or unintentional, initial encounter [facility])          DISPOSITION/PLAN   DISPOSITION Decision To Discharge 10/02/2022 08:24:48 PM      PATIENT REFERRED TO:  AMBER Pineda [address] [facility]  [phone]    Schedule an appointment as soon as possible for a visit in 2 days      [facility] ED  [address] [place] [address],[place] [address]  [phone]  Go to   If symptoms worsen    DISCHARGE MEDICATIONS:  New Prescriptions    No medications on file     Controlled Substances Monitoring:     RX Monitoring 7/24/2015   Attestation The Prescription Monitoring Report for this patient was reviewed today. Periodic Controlled Substance Monitoring No signs of potential drug abuse or diversion identified.        (Please note that portions of this note were completed with a voice recognition program.  Efforts were made to edit the dictations but occasionally words are mis-transcribed.)    AMBER Jackson CNP (electronically signed)  Attending Emergency Physician          AMBER Jacskon CNP  10/02/22 2050

## 2022-10-03 NOTE — ED NOTES
Pt presents to ED via EMS for overdose. Per EMS pt was found in a [employer] parking lot unresponsive. Pt given 8mg IN of Narcan and 2mg IV by EMS. Pt presents alert and oriented x4. Pt able to ambulate to ED room without assist. Pt afebrile, vitals stable. Even, non-labored breathing.         Astrid Hernandez RN  10/02/22 2024

## 2022-10-03 NOTE — DISCHARGE INSTRUCTIONS
Call [facility] [phone] or go to [facility] at 7am Monday - Friday to be evaluated (this is first come first serve) to be evaluated for assistance with stopping. You can also contact Narc[employer] for assistance. Stop using drugs. You can use diphenhydramine (Benadryl) for any feeling of anxiousness. PLEASE RETURN TO THE EMERGENCY DEPARTMENT IMMEDIATELY for worsening symptoms, or if you develop any concerning symptoms such as: high fever not relieved by acetaminophen (Tylenol) and/or ibuprofen (Motrin / Advil), chills, shortness of breath, chest pain, feeling of your heart fluttering or racing, persistent nausea and/or vomiting, vomiting up blood, blood in your stool, numbness, loss of consciousness, weakness or tingling in the arms or legs or change in color of the extremities, changes in mental status, persistent headache, blurry vision, loss of bladder / bowel control, unable to follow up with your physician, or other any other care or concern.

## 2022-10-03 NOTE — ED PROVIDER NOTES
[facility] ED  [address] [facility]  Phone: [phone]      Attending Physician Attestation    I performed a history and physical examination of the patient and discussed management with the mid level provider. I reviewed the mid level provider's note and agree with the documented findings and plan of care. Any areas of disagreement are noted on the chart. I was personally present for the key portions of any procedures. I have documented in the chart those procedures where I was not present during the key portions. I have reviewed the emergency nurses triage note. I agree with the chief complaint, past medical history, past surgical history, allergies, medications, social and family history as documented unless otherwise noted below. Documentation of the HPI, Physical Exam and Medical Decision Making performed by mid level providers is based on my personal performance of the HPI, PE and MDM. For Physician Assistant/ Nurse Practitioner cases/documentation I have personally evaluated this patient and have completed at least one if not all key elements of the E/M (history, physical exam, and MDM). Additional findings are as noted. CHIEF COMPLAINT       Chief Complaint   Patient presents with    Drug Overdose         HISTORY OF PRESENT ILLNESS    Carin Diamond is a 33 y.o. female who presents for evaluation after drug overdose. The patient reports that less than 1 hour ago she started what she thought was heroin but it may have been fentanyl. She states that her and her boyfriend immediately went to the grocery store afterwards and they both accidentally overdosed and woke up with EMS around him. Police administered 8 mg of intranasal Narcan and EMS administered 2 mg of IV Narcan before she woke up. The patient states that she feels back to baseline other than she is cold. She states that this is typically how she feels after receiving Narcan.   The patient does not think that she family history includes Asthma in her brother; Breast Cancer (age of onset: 28) in her mother; COPD in her father; No Known Problems in her maternal grandfather, maternal grandmother, paternal grandfather, and paternal grandmother. SOCIAL HISTORY      reports that she has been smoking cigarettes. She has a 2.00 pack-year smoking history. She has never used smokeless tobacco. She reports that she does not currently use alcohol. She reports that she does not currently use drugs. PHYSICAL EXAM     INITIAL VITALS:  weight is 58.5 kg (129 lb). Her oral temperature is 98.2 °F (36.8 °C). Her blood pressure is 131/85 and her pulse is 63. Her respiration is 18 and oxygen saturation is 100%. CONSTITUTIONAL: no apparent distress, well appearing  SKIN: warm, dry, no jaundice, hives or petechiae  EYES: clear conjunctiva, non-icteric sclera, pupils 3 mm, equal, round reactive to light, extraocular movements intact  HENT: normocephalic, atraumatic, moist mucus membranes, Posterior oropharynx is clear without any erythema, edema, exudate or asymmetry. Uvula midline. No trismus or malocclusion. No pain to palpation over the frontal or maxillary sinuses. No mastoid tenderness. Able to handle secretions. Normal speech. Patent airway. No submental swelling or evidence of cellulitis. No hemotympanum, hameed sign, raccoon eyes or septal hematoma. NECK: Nontender and supple with no nuchal rigidity, full range of motion  PULMONARY: clear to auscultation without wheezes, rhonchi, or rales, normal excursion, no accessory muscle use and no stridor  CARDIOVASCULAR: regular rate, rhythm. Strong radial pulses with intact distal perfusion. Capillary refill <2 seconds. GASTROINTESTINAL: soft, non-tender, non-distended, no palpable masses, no rebound or guarding   GENITOURINARY: No costovertebral angle tenderness to palpation  MUSCULOSKELETAL: No midline spinal tenderness, step off or deformity.  Extremities are otherwise nontender to palpation and nonerythematous. Compartments soft. No peripheral edema. NEUROLOGIC: alert and oriented x 3, GCS 15, normal mentation and speech. Moves all extremities x 4 without motor or sensory deficit, gait is stable without ataxia. Cranial nerves II through XII intact. No cerebellar signs. No pronator drift. Normal finger-to-nose. PSYCHIATRIC: normal mood and affect, thought process is clear and linear      DIAGNOSTIC RESULTS     EKG: All EKG's are interpreted by the Emergency Department Physician who either signs or Co-signs this chart in the absence of a cardiologist.    None    RADIOLOGY:     None    LABS:  No results found for this visit on 10/02/22. HOUR Decision Rule for Discharge following Opiate reversal    One hour after the administration of naloxone for presumed opioid overdose, patients can be safely discharged from the ED if they meet all six criteria:    Element    Can mobilize as usual Yes   Have a normal O2 saturation (>95%) Yes   Have a normal respiratory rate (>10 and <20 breaths/min) Yes   Have a normal temperature (>35.0 and <37.5°C) Yes   Have a normal heart rate (>50 and <100 beats/min) Yes   Have a GCS score of 15 Yes     Based on this rule, the patient is appropriate for discharge and will be discharged with referral for Opiate rehab and/or detox resources. Hospital Observation Upon Reversal (HOUR) With Naloxone: A Prospective Clinical Prediction Rule Validation Study. [employer] Emergency Medicine 2019 Dennis;26(1):7-15. This patient presents awake, alert, and appropriate with no complaints. Patient responded well to Narcan which suggests that the etiology of his altered mental status is opiate induced. Presentation not consistent with acute organic causes to include delirium, or dementia.  Given the H&P, the patient is stable for discharge however the plan will be to watch the patient for the next hour (based on the HOUR decision rule) to ensure that She doesn't have any untoward reactions or develop respiratory failure. Social work has been consulted to find resources for detox. The patient will be discharged with a prescription for Narcan and a Narcan starter kit. The patient will follow-up with resources provided by social work and primary care as needed. EMERGENCY DEPARTMENT COURSE:   Vitals:    Vitals:    10/02/22 1936   BP: 131/85   Pulse: 63   Resp: 18   Temp: 98.2 °F (36.8 °C)   TempSrc: Oral   SpO2: 100%   Weight: 58.5 kg (129 lb)     -------------------------  BP: 131/85, Temp: 98.2 °F (36.8 °C), Heart Rate: 63, Resp: 18      PERTINENT ATTENDING PHYSICIAN COMMENTS:    The patient is a 50-year-old female who presents for evaluation after drug overdose. She received Narcan by police at approximately 1913 p.m. Vital signs are stable. Exam is unremarkable. She is neurovascularly intact and has normal gait. She has not exhibited any respiratory depression. Based on the HOUR decision rule she is able to be discharged after monitoring for over an hour. She has Narcan at home. She was instructed to stop using drugs and to follow-up with her PCP in 1 to 2 days. She was instructed to consider drug rehab and to return to the ER for worsening symptoms or any other concern. The patient understands that at this time there is no evidence for a more malignant underlying process, but also understands that early in the process of an illness or injury, an emergency department work-up can be falsely reassuring. Routine discharge counseling was given, and the patient understands that worsening, changing or persistent symptoms should prompt a immediate call or follow-up with their primary care physician or return to the emergency department. The importance of appropriate follow-up was also discussed. I have reviewed the disposition diagnosis with the patient. I have answered their questions and given discharge instructions.   They voiced understanding of these instructions and did not have any further questions or complaints.         (Please note that portions of this note were completed with a voice recognition program.  Efforts were made to edit the dictations but occasionally words are mis-transcribed.)    Arlen Da Silva DO, [address] [facility]  Attending Emergency Medicine Physician        Arlen Da Silva DO  10/02/22 [id number]

## 2024-09-17 ENCOUNTER — OFFICE VISIT (OUTPATIENT)
Dept: PRIMARY CARE CLINIC | Age: 29
End: 2024-09-17
Payer: COMMERCIAL

## 2024-09-17 VITALS
BODY MASS INDEX: 22.45 KG/M2 | SYSTOLIC BLOOD PRESSURE: 103 MMHG | WEIGHT: 130.8 LBS | HEART RATE: 72 BPM | TEMPERATURE: 98.1 F | DIASTOLIC BLOOD PRESSURE: 67 MMHG | OXYGEN SATURATION: 98 %

## 2024-09-17 DIAGNOSIS — R21 RASH AND NONSPECIFIC SKIN ERUPTION: Primary | ICD-10-CM

## 2024-09-17 DIAGNOSIS — Z12.31 ENCOUNTER FOR SCREENING MAMMOGRAM FOR BREAST CANCER: ICD-10-CM

## 2024-09-17 DIAGNOSIS — Z80.3 FAMILY HISTORY OF MALIGNANT NEOPLASM OF BREAST IN RELATIVE DIAGNOSED WHEN YOUNGER THAN 45 YEARS OF AGE: ICD-10-CM

## 2024-09-17 PROCEDURE — 99213 OFFICE O/P EST LOW 20 MIN: CPT | Performed by: NURSE PRACTITIONER

## 2024-09-17 RX ORDER — BUSPIRONE HYDROCHLORIDE 15 MG/1
15 TABLET ORAL 2 TIMES DAILY
COMMUNITY
Start: 2024-08-08

## 2024-09-17 RX ORDER — CLOTRIMAZOLE AND BETAMETHASONE DIPROPIONATE 10; .64 MG/G; MG/G
CREAM TOPICAL
Qty: 15 G | Refills: 0 | Status: SHIPPED | OUTPATIENT
Start: 2024-09-17

## 2024-09-17 SDOH — ECONOMIC STABILITY: FOOD INSECURITY: WITHIN THE PAST 12 MONTHS, THE FOOD YOU BOUGHT JUST DIDN'T LAST AND YOU DIDN'T HAVE MONEY TO GET MORE.: NEVER TRUE

## 2024-09-17 SDOH — ECONOMIC STABILITY: INCOME INSECURITY: HOW HARD IS IT FOR YOU TO PAY FOR THE VERY BASICS LIKE FOOD, HOUSING, MEDICAL CARE, AND HEATING?: NOT HARD AT ALL

## 2024-09-17 SDOH — ECONOMIC STABILITY: FOOD INSECURITY: WITHIN THE PAST 12 MONTHS, YOU WORRIED THAT YOUR FOOD WOULD RUN OUT BEFORE YOU GOT MONEY TO BUY MORE.: NEVER TRUE

## 2024-09-17 ASSESSMENT — PATIENT HEALTH QUESTIONNAIRE - PHQ9
SUM OF ALL RESPONSES TO PHQ QUESTIONS 1-9: 0
1. LITTLE INTEREST OR PLEASURE IN DOING THINGS: NOT AT ALL
2. FEELING DOWN, DEPRESSED OR HOPELESS: NOT AT ALL
SUM OF ALL RESPONSES TO PHQ9 QUESTIONS 1 & 2: 0
SUM OF ALL RESPONSES TO PHQ QUESTIONS 1-9: 0

## 2024-09-24 ENCOUNTER — TELEPHONE (OUTPATIENT)
Dept: PRIMARY CARE CLINIC | Age: 29
End: 2024-09-24

## 2024-09-24 DIAGNOSIS — N64.4 MASTALGIA IN FEMALE: Primary | ICD-10-CM

## 2025-07-24 ENCOUNTER — OFFICE VISIT (OUTPATIENT)
Age: 30
End: 2025-07-24

## 2025-07-24 VITALS
HEIGHT: 63 IN | SYSTOLIC BLOOD PRESSURE: 96 MMHG | BODY MASS INDEX: 22.15 KG/M2 | HEART RATE: 68 BPM | DIASTOLIC BLOOD PRESSURE: 59 MMHG | WEIGHT: 125 LBS | RESPIRATION RATE: 19 BRPM | OXYGEN SATURATION: 98 % | TEMPERATURE: 98.3 F

## 2025-07-24 DIAGNOSIS — R51.9 FACIAL PAIN, ACUTE: Primary | ICD-10-CM

## 2025-07-24 DIAGNOSIS — R59.0 ANTERIOR CERVICAL LYMPHADENOPATHY: ICD-10-CM

## 2025-07-24 RX ORDER — AMOXICILLIN 500 MG/1
500 CAPSULE ORAL 2 TIMES DAILY
Qty: 20 CAPSULE | Refills: 0 | Status: SHIPPED | OUTPATIENT
Start: 2025-07-24 | End: 2025-08-03

## 2025-07-24 RX ORDER — IBUPROFEN 800 MG/1
800 TABLET, FILM COATED ORAL EVERY 8 HOURS PRN
Qty: 90 TABLET | Refills: 0 | Status: SHIPPED | OUTPATIENT
Start: 2025-07-24

## 2025-07-25 NOTE — PROGRESS NOTES
Sheryl Boyd (: 1995) is a 29 y.o. female, New patient, here for evaluation of the following       Chief complaint(s): Dental Pain        HPI    History provided by:  Patient   used: No    Dental Pain   This is a recurrent problem. The current episode started in the past 7 days. The problem occurs constantly. The problem has been gradually worsening. The pain is moderate. Associated symptoms include facial pain, sinus pressure and thermal sensitivity. Pertinent negatives include no difficulty swallowing, fever or oral bleeding. She has tried acetaminophen for the symptoms. The treatment provided no relief.          PAST MEDICAL HISTORY    Past Medical History:   Diagnosis Date    Anxiety     Depression     Endocarditis     Hepatitis C     Overdose        SURGICAL HISTORY    Past Surgical History:   Procedure Laterality Date    ADENOIDECTOMY         CURRENT MEDICATIONS    Current Outpatient Rx   Medication Sig Dispense Refill    amoxicillin (AMOXIL) 500 MG capsule Take 1 capsule by mouth 2 times daily for 10 days 20 capsule 0    ibuprofen (ADVIL;MOTRIN) 800 MG tablet Take 1 tablet by mouth every 8 hours as needed for Pain 90 tablet 0    busPIRone (BUSPAR) 15 MG tablet Take 15 mg by mouth 2 times daily      clotrimazole-betamethasone (LOTRISONE) 1-0.05 % cream Apply topically 2 times daily. 15 g 0    Norgestim-Eth Estrad Triphasic (ORTHO TRI-CYCLEN, 28,) 0.18/0.215/0.25 MG-35 MCG TABS Use 1 tablet daily as directed. (Patient not taking: Reported on 2024) 28 tablet 3    naproxen (NAPROSYN) 500 MG tablet Take 1 tablet by mouth 2 times daily (with meals) (Patient not taking: Reported on 2024) 20 tablet 0    divalproex (DEPAKOTE) 250 MG DR tablet  (Patient not taking: Reported on 2024)      mirtazapine (REMERON) 45 MG tablet  (Patient not taking: Reported on 2024)      vitamin B-12 (CYANOCOBALAMIN) 100 MCG tablet Take 50 mcg by mouth daily (Patient not taking: Reported